# Patient Record
Sex: FEMALE | Race: WHITE | Employment: UNEMPLOYED | URBAN - METROPOLITAN AREA
[De-identification: names, ages, dates, MRNs, and addresses within clinical notes are randomized per-mention and may not be internally consistent; named-entity substitution may affect disease eponyms.]

---

## 2019-06-11 ENCOUNTER — HOSPITAL ENCOUNTER (INPATIENT)
Age: 29
LOS: 2 days | Discharge: LEFT BEFORE DISCHARGE | DRG: 560 | End: 2019-06-13
Attending: OBSTETRICS & GYNECOLOGY | Admitting: OBSTETRICS & GYNECOLOGY
Payer: COMMERCIAL

## 2019-06-11 PROBLEM — Z64.1 MULTIPARITY: Status: ACTIVE | Noted: 2019-06-11

## 2019-06-11 LAB
HCT VFR BLD CALC: 32.9 % (ref 34–48)
HEMOGLOBIN: 10.9 G/DL (ref 11.5–15.5)
MCH RBC QN AUTO: 30 PG (ref 26–35)
MCHC RBC AUTO-ENTMCNC: 33.1 % (ref 32–34.5)
MCV RBC AUTO: 90.6 FL (ref 80–99.9)
PDW BLD-RTO: 13.8 FL (ref 11.5–15)
PLATELET # BLD: 253 E9/L (ref 130–450)
PMV BLD AUTO: 11.3 FL (ref 7–12)
RBC # BLD: 3.63 E12/L (ref 3.5–5.5)
WBC # BLD: 13.2 E9/L (ref 4.5–11.5)

## 2019-06-11 PROCEDURE — 86762 RUBELLA ANTIBODY: CPT

## 2019-06-11 PROCEDURE — 86592 SYPHILIS TEST NON-TREP QUAL: CPT

## 2019-06-11 PROCEDURE — 86703 HIV-1/HIV-2 1 RESULT ANTBDY: CPT

## 2019-06-11 PROCEDURE — 86901 BLOOD TYPING SEROLOGIC RH(D): CPT

## 2019-06-11 PROCEDURE — 1220000001 HC SEMI PRIVATE L&D R&B

## 2019-06-11 PROCEDURE — 87340 HEPATITIS B SURFACE AG IA: CPT

## 2019-06-11 PROCEDURE — 36415 COLL VENOUS BLD VENIPUNCTURE: CPT

## 2019-06-11 PROCEDURE — 86850 RBC ANTIBODY SCREEN: CPT

## 2019-06-11 PROCEDURE — 85027 COMPLETE CBC AUTOMATED: CPT

## 2019-06-11 PROCEDURE — 86900 BLOOD TYPING SEROLOGIC ABO: CPT

## 2019-06-11 RX ORDER — ONDANSETRON 2 MG/ML
4 INJECTION INTRAMUSCULAR; INTRAVENOUS EVERY 6 HOURS PRN
Status: DISCONTINUED | OUTPATIENT
Start: 2019-06-11 | End: 2019-06-12

## 2019-06-11 RX ORDER — SODIUM CHLORIDE 0.9 % (FLUSH) 0.9 %
10 SYRINGE (ML) INJECTION PRN
Status: DISCONTINUED | OUTPATIENT
Start: 2019-06-11 | End: 2019-06-12

## 2019-06-11 RX ORDER — DOCUSATE SODIUM 100 MG/1
100 CAPSULE, LIQUID FILLED ORAL 2 TIMES DAILY
Status: DISCONTINUED | OUTPATIENT
Start: 2019-06-11 | End: 2019-06-12

## 2019-06-11 RX ORDER — SODIUM CHLORIDE 0.9 % (FLUSH) 0.9 %
10 SYRINGE (ML) INJECTION EVERY 12 HOURS SCHEDULED
Status: DISCONTINUED | OUTPATIENT
Start: 2019-06-11 | End: 2019-06-12

## 2019-06-11 RX ORDER — ACETAMINOPHEN 325 MG/1
650 TABLET ORAL EVERY 4 HOURS PRN
Status: DISCONTINUED | OUTPATIENT
Start: 2019-06-11 | End: 2019-06-12

## 2019-06-11 RX ORDER — SODIUM CHLORIDE, SODIUM LACTATE, POTASSIUM CHLORIDE, CALCIUM CHLORIDE 600; 310; 30; 20 MG/100ML; MG/100ML; MG/100ML; MG/100ML
INJECTION, SOLUTION INTRAVENOUS CONTINUOUS
Status: DISCONTINUED | OUTPATIENT
Start: 2019-06-11 | End: 2019-06-12

## 2019-06-12 ENCOUNTER — ANESTHESIA (OUTPATIENT)
Dept: LABOR AND DELIVERY | Age: 29
DRG: 560 | End: 2019-06-12
Payer: COMMERCIAL

## 2019-06-12 ENCOUNTER — ANESTHESIA EVENT (OUTPATIENT)
Dept: LABOR AND DELIVERY | Age: 29
DRG: 560 | End: 2019-06-12
Payer: COMMERCIAL

## 2019-06-12 LAB
ABO/RH: NORMAL
AMPHETAMINE SCREEN, URINE: NOT DETECTED
ANTIBODY SCREEN: NORMAL
BARBITURATE SCREEN URINE: NOT DETECTED
BENZODIAZEPINE SCREEN, URINE: NOT DETECTED
CANNABINOID SCREEN URINE: POSITIVE
COCAINE METABOLITE SCREEN URINE: NOT DETECTED
HEPATITIS B SURFACE ANTIGEN INTERPRETATION: NORMAL
HIV-1 AND HIV-2 ANTIBODIES: NORMAL
METHADONE SCREEN, URINE: NOT DETECTED
OPIATE SCREEN URINE: NOT DETECTED
PHENCYCLIDINE SCREEN URINE: NOT DETECTED
PROPOXYPHENE SCREEN: NOT DETECTED

## 2019-06-12 PROCEDURE — 87491 CHLMYD TRACH DNA AMP PROBE: CPT

## 2019-06-12 PROCEDURE — 1220000000 HC SEMI PRIVATE OB R&B

## 2019-06-12 PROCEDURE — 80307 DRUG TEST PRSMV CHEM ANLYZR: CPT

## 2019-06-12 PROCEDURE — 6370000000 HC RX 637 (ALT 250 FOR IP)

## 2019-06-12 PROCEDURE — 6370000000 HC RX 637 (ALT 250 FOR IP): Performed by: OBSTETRICS & GYNECOLOGY

## 2019-06-12 PROCEDURE — 2500000003 HC RX 250 WO HCPCS: Performed by: ANESTHESIOLOGY

## 2019-06-12 PROCEDURE — 2580000003 HC RX 258: Performed by: OBSTETRICS & GYNECOLOGY

## 2019-06-12 PROCEDURE — 7200000001 HC VAGINAL DELIVERY

## 2019-06-12 PROCEDURE — 3700000025 EPIDURAL BLOCK: Performed by: ANESTHESIOLOGY

## 2019-06-12 PROCEDURE — 87591 N.GONORRHOEAE DNA AMP PROB: CPT

## 2019-06-12 PROCEDURE — 6360000002 HC RX W HCPCS

## 2019-06-12 PROCEDURE — G0480 DRUG TEST DEF 1-7 CLASSES: HCPCS

## 2019-06-12 PROCEDURE — 6360000002 HC RX W HCPCS: Performed by: OBSTETRICS & GYNECOLOGY

## 2019-06-12 RX ORDER — ONDANSETRON 2 MG/ML
4 INJECTION INTRAMUSCULAR; INTRAVENOUS EVERY 6 HOURS PRN
Status: DISCONTINUED | OUTPATIENT
Start: 2019-06-12 | End: 2019-06-12

## 2019-06-12 RX ORDER — OXYCODONE HYDROCHLORIDE AND ACETAMINOPHEN 5; 325 MG/1; MG/1
TABLET ORAL
Status: COMPLETED
Start: 2019-06-12 | End: 2019-06-12

## 2019-06-12 RX ORDER — METHYLERGONOVINE MALEATE 0.2 MG/ML
200 INJECTION INTRAVENOUS ONCE
Status: COMPLETED | OUTPATIENT
Start: 2019-06-12 | End: 2019-06-12

## 2019-06-12 RX ORDER — ACETAMINOPHEN 325 MG/1
650 TABLET ORAL EVERY 4 HOURS PRN
Status: DISCONTINUED | OUTPATIENT
Start: 2019-06-12 | End: 2019-06-13 | Stop reason: HOSPADM

## 2019-06-12 RX ORDER — SODIUM CHLORIDE 0.9 % (FLUSH) 0.9 %
10 SYRINGE (ML) INJECTION PRN
Status: DISCONTINUED | OUTPATIENT
Start: 2019-06-12 | End: 2019-06-13 | Stop reason: HOSPADM

## 2019-06-12 RX ORDER — SODIUM CHLORIDE, SODIUM LACTATE, POTASSIUM CHLORIDE, CALCIUM CHLORIDE 600; 310; 30; 20 MG/100ML; MG/100ML; MG/100ML; MG/100ML
INJECTION, SOLUTION INTRAVENOUS CONTINUOUS
Status: DISCONTINUED | OUTPATIENT
Start: 2019-06-12 | End: 2019-06-13 | Stop reason: HOSPADM

## 2019-06-12 RX ORDER — NALBUPHINE HCL 10 MG/ML
5 AMPUL (ML) INJECTION EVERY 4 HOURS PRN
Status: DISCONTINUED | OUTPATIENT
Start: 2019-06-12 | End: 2019-06-12

## 2019-06-12 RX ORDER — CARBOPROST TROMETHAMINE 250 UG/ML
250 INJECTION, SOLUTION INTRAMUSCULAR ONCE
Status: DISCONTINUED | OUTPATIENT
Start: 2019-06-12 | End: 2019-06-13 | Stop reason: HOSPADM

## 2019-06-12 RX ORDER — NALOXONE HYDROCHLORIDE 0.4 MG/ML
0.4 INJECTION, SOLUTION INTRAMUSCULAR; INTRAVENOUS; SUBCUTANEOUS PRN
Status: DISCONTINUED | OUTPATIENT
Start: 2019-06-12 | End: 2019-06-12

## 2019-06-12 RX ORDER — SODIUM CHLORIDE 0.9 % (FLUSH) 0.9 %
10 SYRINGE (ML) INJECTION EVERY 12 HOURS SCHEDULED
Status: DISCONTINUED | OUTPATIENT
Start: 2019-06-12 | End: 2019-06-13 | Stop reason: HOSPADM

## 2019-06-12 RX ORDER — IBUPROFEN 800 MG/1
800 TABLET ORAL EVERY 8 HOURS PRN
Status: DISCONTINUED | OUTPATIENT
Start: 2019-06-12 | End: 2019-06-13 | Stop reason: HOSPADM

## 2019-06-12 RX ORDER — METHYLERGONOVINE MALEATE 0.2 MG/ML
INJECTION INTRAVENOUS
Status: COMPLETED
Start: 2019-06-12 | End: 2019-06-12

## 2019-06-12 RX ORDER — CARBOPROST TROMETHAMINE 250 UG/ML
INJECTION, SOLUTION INTRAMUSCULAR
Status: DISPENSED
Start: 2019-06-12 | End: 2019-06-12

## 2019-06-12 RX ORDER — FERROUS SULFATE 325(65) MG
325 TABLET ORAL 2 TIMES DAILY WITH MEALS
Status: DISCONTINUED | OUTPATIENT
Start: 2019-06-12 | End: 2019-06-13 | Stop reason: HOSPADM

## 2019-06-12 RX ORDER — DOCUSATE SODIUM 100 MG/1
100 CAPSULE, LIQUID FILLED ORAL 2 TIMES DAILY
Status: DISCONTINUED | OUTPATIENT
Start: 2019-06-12 | End: 2019-06-13 | Stop reason: HOSPADM

## 2019-06-12 RX ORDER — ONDANSETRON 2 MG/ML
INJECTION INTRAMUSCULAR; INTRAVENOUS
Status: COMPLETED
Start: 2019-06-12 | End: 2019-06-12

## 2019-06-12 RX ORDER — LANOLIN 100 %
OINTMENT (GRAM) TOPICAL PRN
Status: DISCONTINUED | OUTPATIENT
Start: 2019-06-12 | End: 2019-06-13 | Stop reason: HOSPADM

## 2019-06-12 RX ORDER — IBUPROFEN 800 MG/1
TABLET ORAL
Status: COMPLETED
Start: 2019-06-12 | End: 2019-06-12

## 2019-06-12 RX ORDER — OXYCODONE HYDROCHLORIDE AND ACETAMINOPHEN 5; 325 MG/1; MG/1
1 TABLET ORAL ONCE
Status: COMPLETED | OUTPATIENT
Start: 2019-06-12 | End: 2019-06-12

## 2019-06-12 RX ADMIN — IBUPROFEN 800 MG: 800 TABLET, FILM COATED ORAL at 03:46

## 2019-06-12 RX ADMIN — IBUPROFEN 800 MG: 800 TABLET, FILM COATED ORAL at 14:36

## 2019-06-12 RX ADMIN — IBUPROFEN 800 MG: 800 TABLET, FILM COATED ORAL at 23:02

## 2019-06-12 RX ADMIN — Medication 999 MILLI-UNITS/MIN: at 02:06

## 2019-06-12 RX ADMIN — ONDANSETRON 4 MG: 2 INJECTION INTRAMUSCULAR; INTRAVENOUS at 05:29

## 2019-06-12 RX ADMIN — OXYCODONE HYDROCHLORIDE AND ACETAMINOPHEN 1 TABLET: 5; 325 TABLET ORAL at 05:15

## 2019-06-12 RX ADMIN — ACETAMINOPHEN 650 MG: 325 TABLET ORAL at 20:46

## 2019-06-12 RX ADMIN — DOCUSATE SODIUM 100 MG: 100 CAPSULE, LIQUID FILLED ORAL at 20:46

## 2019-06-12 RX ADMIN — METHYLERGONOVINE MALEATE 200 MCG: 0.2 INJECTION INTRAVENOUS at 05:05

## 2019-06-12 RX ADMIN — Medication 5 ML: at 00:53

## 2019-06-12 RX ADMIN — METHYLERGONOVINE MALEATE 200 MCG: 0.2 INJECTION, SOLUTION INTRAMUSCULAR; INTRAVENOUS at 05:05

## 2019-06-12 RX ADMIN — Medication 15 ML/HR: at 00:58

## 2019-06-12 RX ADMIN — Medication 10 ML: at 08:08

## 2019-06-12 RX ADMIN — SODIUM CHLORIDE, POTASSIUM CHLORIDE, SODIUM LACTATE AND CALCIUM CHLORIDE: 600; 310; 30; 20 INJECTION, SOLUTION INTRAVENOUS at 05:08

## 2019-06-12 RX ADMIN — BENZOCAINE AND LEVOMENTHOL: 200; 5 SPRAY TOPICAL at 08:22

## 2019-06-12 RX ADMIN — DOCUSATE SODIUM 100 MG: 100 CAPSULE, LIQUID FILLED ORAL at 08:07

## 2019-06-12 SDOH — HEALTH STABILITY: MENTAL HEALTH: HOW OFTEN DO YOU HAVE A DRINK CONTAINING ALCOHOL?: NEVER

## 2019-06-12 ASSESSMENT — PAIN SCALES - GENERAL
PAINLEVEL_OUTOF10: 0
PAINLEVEL_OUTOF10: 6
PAINLEVEL_OUTOF10: 8
PAINLEVEL_OUTOF10: 3
PAINLEVEL_OUTOF10: 10
PAINLEVEL_OUTOF10: 10

## 2019-06-12 NOTE — PROGRESS NOTES
Called because passed few large clots  Uterus just above umbilicus. Large pancake size clot removed from uterus and vagina.  Uterus firm and at umbulicus  Iv pitocin bolus given and methergine im given  Patient with history or asthma  Ms=147/64 p=70  Will observe in labor and delivery

## 2019-06-12 NOTE — ANESTHESIA PRE PROCEDURE
Active Problem List   Diagnosis Code    Multiparity Z64.1       Past Medical History:  No past medical history on file. Past Surgical History:  No past surgical history on file. Social History:    Social History     Tobacco Use    Smoking status: Not on file   Substance Use Topics    Alcohol use: Not on file                                Counseling given: Not Answered      Vital Signs (Current): There were no vitals filed for this visit. BP Readings from Last 3 Encounters:   No data found for BP       NPO Status:                                                                                 BMI:   Wt Readings from Last 3 Encounters:   No data found for Wt     There is no height or weight on file to calculate BMI.    CBC:   Lab Results   Component Value Date    WBC 13.2 06/11/2019    RBC 3.63 06/11/2019    HGB 10.9 06/11/2019    HCT 32.9 06/11/2019    MCV 90.6 06/11/2019    RDW 13.8 06/11/2019     06/11/2019       CMP: No results found for: NA, K, CL, CO2, BUN, CREATININE, GFRAA, AGRATIO, LABGLOM, GLUCOSE, PROT, CALCIUM, BILITOT, ALKPHOS, AST, ALT    POC Tests: No results for input(s): POCGLU, POCNA, POCK, POCCL, POCBUN, POCHEMO, POCHCT in the last 72 hours.     Coags: No results found for: PROTIME, INR, APTT    HCG (If Applicable): No results found for: PREGTESTUR, PREGSERUM, HCG, HCGQUANT     ABGs: No results found for: PHART, PO2ART, KEX2CHC, UEE7IVC, BEART, Y8CSCIOG     Type & Screen (If Applicable):  No results found for: LABABO, LABRH    Anesthesia Evaluation   no history of anesthetic complications:   Airway: Mallampati: II  TM distance: >3 FB   Neck ROM: full  Mouth opening: > = 3 FB Dental: normal exam         Pulmonary:normal exam  breath sounds clear to auscultation  (+) asthma:                            Cardiovascular:Negative CV ROS            Rhythm: regular  Rate: normal                    Neuro/Psych:   Negative Neuro/Psych ROS GI/Hepatic/Renal: Neg GI/Hepatic/Renal ROS            Endo/Other: Negative Endo/Other ROS                    Abdominal:           Vascular: negative vascular ROS.                                        Anesthesia Plan      epidural     ASA 2                                 VIGNESH Chen - CRNA   6/12/2019

## 2019-06-12 NOTE — PLAN OF CARE
Problem: Fluid Volume - Imbalance:  Goal: Absence of imbalanced fluid volume signs and symptoms  Description  Absence of imbalanced fluid volume signs and symptoms  Outcome: Met This Shift  Goal: Absence of postpartum hemorrhage signs and symptoms  Description  Absence of postpartum hemorrhage signs and symptoms  Outcome: Met This Shift     Problem: Infection - Risk of, Puerperal Infection:  Goal: Will show no infection signs and symptoms  Description  Will show no infection signs and symptoms  Outcome: Met This Shift     Problem: Mood - Altered:  Goal: Mood stable  Description  Mood stable  Outcome: Met This Shift     Problem: Pain - Acute:  Goal: Pain level will decrease  Description  Pain level will decrease  Outcome: Met This Shift

## 2019-06-12 NOTE — L&D DELIVERY SUMMARY NOTE
Vaginal Delivery    Infant Wt:  pending    APGARS:     1 minute: 8  5 minute: 9    Time of Delivery: 0202  Delayed cord clamping for 30 seconds. Fetal Position: occiput anterior    Baby Suction with bulb on the perineum. Spontaneous respirations. Placenta delivered spontaneously intact at [place] [address]. Placenta sent to pathology: No     Estimated blood loss:  300 cc    Uterus firm, pitocin running. rectum intact. Episiotomy: No    Laceration: No    Cord blood and gases done  Sponge count correct. No complications.

## 2019-06-12 NOTE — PROGRESS NOTES
Pt asked if she can go outside to smoke. Pt instructed that she is not allowed to leave our unit. no stairwells or elevators. Pt states \"it has been a rough morning\". Pacing around room.

## 2019-06-12 NOTE — PROGRESS NOTES
Patient fundus firm and U/U with minimal bleeding and was not dizzy when ambulating. BP's within patient's normal baseline. Per Dr. Nazario Gallegos, may transfer patient over to [facility]

## 2019-06-12 NOTE — ANESTHESIA PROCEDURE NOTES
Epidural Block    Patient location during procedure: OB  Reason for block: labor epidural  Staffing  Anesthesiologist: Milad Hdz MD  Resident/CRNA: VIGNESH Dave - CRNA  Performed: resident/CRNA   Preanesthetic Checklist  Completed: patient identified, site marked, surgical consent, pre-op evaluation, timeout performed, IV checked, risks and benefits discussed, monitors and equipment checked, anesthesia consent given, oxygen available and patient being monitored  Epidural  Patient position: sitting  Prep: ChloraPrep  Patient monitoring: cardiac monitor, continuous pulse ox and frequent blood pressure checks  Approach: midline  Location: lumbar (1-5)  Injection technique: REFUGIO saline  Provider prep: mask and sterile gloves  Needle  Needle type: Tuohy   Needle gauge: 18 G  Needle length: 2.5 in  Needle insertion depth: 7 cm  Catheter type: end hole  Catheter size: 20 G.   Catheter at skin depth: 15 cm  Test dose: negative  Assessment  Hemodynamics: stable  Attempts: 1

## 2019-06-12 NOTE — PROGRESS NOTES
Not pushing well. Wants epidural like had with other two deliveries.   fht's 120's with accels  May have epidural then to push Patient states she is feeling a little better. She now has hip to hip, tailbone pain. Patient is going to chiropractor today. She states the Valium helped a lot. Patient will be here for appt tomorrow.

## 2019-06-12 NOTE — CARE COORDINATION
SW Discharge Planning   SW received consult for [facility] usage during pregnancy ( Mom with positive UDS on 6/11)    SW met with Marcella Mcpherson ([birth year]) ( denies having phone) mother to baby boy, Juan Todd ( 6/12/19). Also present in the room was baby's reported father, Kasia Vergara ( 1/2/90) of whom Susana Dye gave SW permission to speak freely in front of. Susana Dye reported that she is originally from [place], and stated that she lives at the address listed in the chart, however plans on moving to [place] in the next few weeks. Susana Dye stated that she lives with her two other children, Mejia Ray age 9 1/2 and Radha age 3 1/2, she declined to provide last name or date of births. Corine James reported that he resides at  [address] [place] [address], and declined to provide a phone number. Bhavin reported that both she and Corine James are unemployed and that she plans on adding baby to her [employer] through [place]. Per Susana Dye, her prenatal care was with Dr. Xuan Pringle in [place], and she stated that pediatric care will be with [place]. Bhavin was unable to spell or provide contact information for pediatrician to  including pediatrician's phone number or address. Susana Dye stated that she plans on going back to [place] on Monday for a few weeks, and reported that baby already has a pediatrician appointment. Susana Dye stated that she has all needed infant items including a car seat and crib, and declined [facility] referral at this time. Susana Dye did report a history of depression and post partum depression. We discussed awareness of Post Partum Depression and encouraged contact with her OB if any problems arise. Roseannejefferson denied any past or current legal history, CSB involvement or domestic violence. JOSE A discussed Matt's positive UDS for THC on 6/12, and she admitted using THC during pregnancy.  Per Susana Dye, [facility] is legal in [place], and she stated that she does not believe in using medication, however believes that [facility] is a natural medicine which is \" better for you\". SW discussed the need for a [facility] ( [phone]) referral. Eric Pozo became upset, and concerned that her children would be taken away from her. SW attempted to provide support and comfort. Bhavin's anxiety was noted as she began to ask to go outside to smoke a cigarette , and was noted to be pacing back and forth in her room. Bhavin reported to this SW, \" I have to go back to [place] whether they are open or not. My son has his doctor's appointment up there\". SW provided Eric Pozo with a list of pediatricians in the area as well as a list of [place]. SW called [facility] ( [phone]) and spole with , Duncan Correa, providing him with concerns. PLAN     Baby can NOT be discharged home until [facility] ( [phone]) provides disp  SW to continue to communicate with nursing staff and [facility] ( [phone])   [facility] was declined   List of resources and pediatricians provided to mother.      Electronically signed by TAMY Juárez on 6/12/2019 at 11:02 AM

## 2019-06-12 NOTE — PROGRESS NOTES
Up and about in room. heplock intact. Pt states she has been up to the bathroom and voided and understands the bettye bottle. Pt states she has not had any more clots. Instructed to let RN know if she does. Paperwork and visiting hours reviewed with pt. Phone within reach.

## 2019-06-13 VITALS
SYSTOLIC BLOOD PRESSURE: 117 MMHG | RESPIRATION RATE: 16 BRPM | HEIGHT: 65 IN | TEMPERATURE: 97.5 F | DIASTOLIC BLOOD PRESSURE: 57 MMHG | HEART RATE: 84 BPM | BODY MASS INDEX: 26.66 KG/M2 | WEIGHT: 160 LBS

## 2019-06-13 LAB
RPR: NORMAL
RUBELLA ANTIBODY IGG: NORMAL

## 2019-06-13 PROCEDURE — 6370000000 HC RX 637 (ALT 250 FOR IP): Performed by: OBSTETRICS & GYNECOLOGY

## 2019-06-13 RX ADMIN — ACETAMINOPHEN 650 MG: 325 TABLET ORAL at 05:35

## 2019-06-13 RX ADMIN — DOCUSATE SODIUM 100 MG: 100 CAPSULE, LIQUID FILLED ORAL at 09:44

## 2019-06-13 RX ADMIN — IBUPROFEN 800 MG: 800 TABLET, FILM COATED ORAL at 09:44

## 2019-06-13 ASSESSMENT — PAIN SCALES - GENERAL
PAINLEVEL_OUTOF10: 3
PAINLEVEL_OUTOF10: 10

## 2019-06-13 NOTE — FLOWSHEET NOTE
Reviewed discharge teaching with patient. Patient verbalizes understanding and when to call doctor. Patient now reports her 332 year old is here in the area and she needs to be with here because her  is going camping. AMA papers signed.  to nursery.  Patient has number to nursery to check on baby and states she will be back at 6 am.

## 2019-06-13 NOTE — PLAN OF CARE
Problem: Constipation:  Goal: Bowel elimination is within specified parameters  Description  Bowel elimination is within specified parameters  Outcome: Met This Shift     Problem: Fluid Volume - Imbalance:  Goal: Absence of imbalanced fluid volume signs and symptoms  Description  Absence of imbalanced fluid volume signs and symptoms  Outcome: Met This Shift  Goal: Absence of postpartum hemorrhage signs and symptoms  Description  Absence of postpartum hemorrhage signs and symptoms  Outcome: Met This Shift     Problem: Infection - Risk of, Puerperal Infection:  Goal: Will show no infection signs and symptoms  Description  Will show no infection signs and symptoms  Outcome: Met This Shift     Problem: Mood - Altered:  Goal: Mood stable  Description  Mood stable  Outcome: Met This Shift

## 2019-06-13 NOTE — FLOWSHEET NOTE
Patient reports she is leaving and will come get baby tomorrow AM. Instructed patient that she would need to sign AMA papers due to no discharge. Patient aware that peds not writing discharge for today. Dr Carmon Olszewski paged and is in the OR at this time.  notified.

## 2019-06-13 NOTE — FLOWSHEET NOTE
Patient upset and stating there is no reason why her and baby can't go home. Patient states \"we are both healthy and that is what matters\". Pt reports she has a 332 year old in [place] and as of tomorrow morning she has no one to babysit her. Patient aware Dr. Dorothy Kamara discharging patient. Patient made aware  doesn't have an order to discharge either. Patient reports she needs to go. Patient reports she talked to  and has her number. Our  notified and calling [facility].

## 2019-06-13 NOTE — PROGRESS NOTES
Subjective:    Patient without complaints. Normal lochia. Objective:  BP (!) 117/57   Pulse 84   Temp 97.5 °F (36.4 °C) (Oral)   Resp 16   Ht 5' 5\" (1.651 m)   Wt 160 lb (72.6 kg)   LMP 09/03/2018   BMI 26.63 kg/m²    Lungs cta  Heart rrr  Abdomen:  Uterus firm, non-tender  Extremities:  No calf pain    Assessment:  Post-partum day # 1   vagilnal delivery  Care in [address] [place] [address] + marijuana.  Being seen by social service    Plan:Continue current care

## 2019-06-13 NOTE — CARE COORDINATION
Discharge planning     Roseannejefferson's nurse approached SW to inform SW that Nanda Parents wants to be discharged today, however her physician would like to keep her an extra day for medical reasons. Nurse reported that Nanda Parents stated that she needs to go back to [place] as her two year old has no . SW called and informed [facility] ( [phone]) , [facility], of the situation, who stated she would call mother to speak with her. SW also noted that mother provided a different address upon registration [address] [place] [address], [address] [place] [address] Dr HURT and provided that information to [facility]. Nursing staff reported to SW that mother has decided to leave against medical advice, and is willing to leave the baby here at the hospital until she returns tomorrow. [facility] was updated. PLAN  Baby can NOT be discharged until [facility] ( [phone]) provides disposition  SW to continue communication with [facility] ( [phone])  Mother has chosen to leave against medical advice.     Electronically signed by TAMY Meneses on 6/13/2019 at 1:24 PM

## 2019-06-14 LAB
C. TRACHOMATIS DNA ,URINE: NEGATIVE
N. GONORRHOEAE DNA, URINE: NEGATIVE
SOURCE: NORMAL

## 2019-06-14 NOTE — ANESTHESIA POSTPROCEDURE EVALUATION
Department of Anesthesiology  Postprocedure Note    Patient: Jose Alfredo Lai  MRN: [id number]  [place]: 1990  Date of evaluation: 6/14/2019  Time:  2:36 PM     Procedure Summary     Date:  06/12/19 Room / Location:      Anesthesia Start:  0040 Anesthesia Stop:  0202    Procedure:  Labor Analgesia Diagnosis:      Scheduled Providers:   Responsible Provider:  Chio Pedro MD    Anesthesia Type:  epidural ASA Status:  2          Anesthesia Type: epidural    Henry Phase I:      Henry Phase II:      Last vitals: Reviewed and per EMR flowsheets.        Anesthesia Post Evaluation    Patient location during evaluation: floor  Patient participation: complete - patient participated  Level of consciousness: awake and alert  Airway patency: patent  Nausea & Vomiting: no nausea and no vomiting  Complications: no  Cardiovascular status: hemodynamically stable  Respiratory status: acceptable  Hydration status: euvolemic

## 2019-06-16 LAB — CANNABINOIDS CONF, URINE: >500 NG/ML

## 2019-09-13 ENCOUNTER — HOSPITAL ENCOUNTER (EMERGENCY)
Age: 29
Discharge: HOME | End: 2019-09-13

## 2019-09-13 VITALS
TEMPERATURE: 98.7 F | HEIGHT: 65 IN | DIASTOLIC BLOOD PRESSURE: 59 MMHG | SYSTOLIC BLOOD PRESSURE: 120 MMHG | BODY MASS INDEX: 22.49 KG/M2 | HEART RATE: 81 BPM | RESPIRATION RATE: 18 BRPM | OXYGEN SATURATION: 98 % | WEIGHT: 135 LBS

## 2019-09-13 DIAGNOSIS — K08.89 PAIN, DENTAL: Primary | ICD-10-CM

## 2019-09-13 DIAGNOSIS — K02.9 DENTAL CARIES: ICD-10-CM

## 2019-09-13 PROCEDURE — 99282 EMERGENCY DEPT VISIT SF MDM: CPT

## 2019-09-13 PROCEDURE — 6370000000 HC RX 637 (ALT 250 FOR IP): Performed by: NURSE PRACTITIONER

## 2019-09-13 RX ORDER — CHLORHEXIDINE GLUCONATE 0.12 MG/ML
15 RINSE ORAL 2 TIMES DAILY
Qty: 420 ML | Refills: 0 | Status: SHIPPED | OUTPATIENT
Start: 2019-09-13 | End: 2019-09-27

## 2019-09-13 RX ORDER — ONDANSETRON 4 MG/1
4 TABLET, ORALLY DISINTEGRATING ORAL 3 TIMES DAILY PRN
Qty: 21 TABLET | Refills: 0 | Status: ON HOLD | OUTPATIENT
Start: 2019-09-13 | End: 2020-07-30 | Stop reason: HOSPADM

## 2019-09-13 RX ORDER — LIDOCAINE HYDROCHLORIDE 20 MG/ML
5 SOLUTION OROPHARYNGEAL PRN
Qty: 100 ML | Refills: 0 | Status: ON HOLD | OUTPATIENT
Start: 2019-09-13 | End: 2020-07-30 | Stop reason: HOSPADM

## 2019-09-13 RX ORDER — ONDANSETRON 4 MG/1
4 TABLET, ORALLY DISINTEGRATING ORAL ONCE
Status: COMPLETED | OUTPATIENT
Start: 2019-09-13 | End: 2019-09-13

## 2019-09-13 RX ORDER — PENICILLIN V POTASSIUM 250 MG/1
500 TABLET ORAL ONCE
Status: COMPLETED | OUTPATIENT
Start: 2019-09-13 | End: 2019-09-13

## 2019-09-13 RX ORDER — LIDOCAINE HYDROCHLORIDE 20 MG/ML
15 SOLUTION OROPHARYNGEAL ONCE
Status: COMPLETED | OUTPATIENT
Start: 2019-09-13 | End: 2019-09-13

## 2019-09-13 RX ORDER — PENICILLIN V POTASSIUM 500 MG/1
500 TABLET ORAL 4 TIMES DAILY
Qty: 40 TABLET | Refills: 0 | Status: SHIPPED | OUTPATIENT
Start: 2019-09-13 | End: 2019-09-23

## 2019-09-13 RX ORDER — HYDROCODONE BITARTRATE AND ACETAMINOPHEN 5; 325 MG/1; MG/1
1 TABLET ORAL ONCE
Status: COMPLETED | OUTPATIENT
Start: 2019-09-13 | End: 2019-09-13

## 2019-09-13 RX ADMIN — HYDROCODONE BITARTRATE AND ACETAMINOPHEN 1 TABLET: 5; 325 TABLET ORAL at 21:27

## 2019-09-13 RX ADMIN — PENICILLIN V POTASIUM 500 MG: 250 TABLET ORAL at 21:27

## 2019-09-13 RX ADMIN — LIDOCAINE HYDROCHLORIDE 15 ML: 20 SOLUTION ORAL; TOPICAL at 21:27

## 2019-09-13 RX ADMIN — ONDANSETRON 4 MG: 4 TABLET, ORALLY DISINTEGRATING ORAL at 21:27

## 2019-09-13 ASSESSMENT — PAIN DESCRIPTION - ORIENTATION: ORIENTATION: RIGHT;LOWER

## 2019-09-13 ASSESSMENT — PAIN SCALES - GENERAL
PAINLEVEL_OUTOF10: 10
PAINLEVEL_OUTOF10: 10

## 2019-09-13 ASSESSMENT — PAIN DESCRIPTION - PAIN TYPE: TYPE: ACUTE PAIN

## 2019-09-13 ASSESSMENT — PAIN DESCRIPTION - FREQUENCY: FREQUENCY: CONTINUOUS

## 2019-09-13 ASSESSMENT — PAIN DESCRIPTION - PROGRESSION: CLINICAL_PROGRESSION: NOT CHANGED

## 2019-09-13 ASSESSMENT — PAIN DESCRIPTION - ONSET: ONSET: GRADUAL

## 2019-09-13 ASSESSMENT — PAIN DESCRIPTION - DESCRIPTORS: DESCRIPTORS: ACHING;DISCOMFORT;SORE;SHOOTING

## 2019-09-13 ASSESSMENT — PAIN DESCRIPTION - LOCATION: LOCATION: MOUTH

## 2020-07-29 ENCOUNTER — HOSPITAL ENCOUNTER (INPATIENT)
Age: 30
LOS: 1 days | Discharge: HOME | DRG: 560 | End: 2020-07-30
Attending: OBSTETRICS & GYNECOLOGY | Admitting: OBSTETRICS & GYNECOLOGY
Payer: COMMERCIAL

## 2020-07-29 LAB
ABO/RH: NORMAL
ANTIBODY SCREEN: NORMAL
BASOPHILS ABSOLUTE: 0.03 E9/L (ref 0–0.2)
BASOPHILS RELATIVE PERCENT: 0.3 % (ref 0–2)
EOSINOPHILS ABSOLUTE: 0.31 E9/L (ref 0.05–0.5)
EOSINOPHILS RELATIVE PERCENT: 3.1 % (ref 0–6)
HCT VFR BLD CALC: 35 % (ref 34–48)
HEMOGLOBIN: 11.2 G/DL (ref 11.5–15.5)
IMMATURE GRANULOCYTES #: 0.1 E9/L
IMMATURE GRANULOCYTES %: 1 % (ref 0–5)
LYMPHOCYTES ABSOLUTE: 1.66 E9/L (ref 1.5–4)
LYMPHOCYTES RELATIVE PERCENT: 16.4 % (ref 20–42)
MCH RBC QN AUTO: 27.5 PG (ref 26–35)
MCHC RBC AUTO-ENTMCNC: 32 % (ref 32–34.5)
MCV RBC AUTO: 86 FL (ref 80–99.9)
MONOCYTES ABSOLUTE: 1.1 E9/L (ref 0.1–0.95)
MONOCYTES RELATIVE PERCENT: 10.8 % (ref 2–12)
NEUTROPHILS ABSOLUTE: 6.95 E9/L (ref 1.8–7.3)
NEUTROPHILS RELATIVE PERCENT: 68.4 % (ref 43–80)
PDW BLD-RTO: 16.7 FL (ref 11.5–15)
PLATELET # BLD: 216 E9/L (ref 130–450)
PMV BLD AUTO: 11.3 FL (ref 7–12)
RBC # BLD: 4.07 E12/L (ref 3.5–5.5)
WBC # BLD: 10.2 E9/L (ref 4.5–11.5)

## 2020-07-29 PROCEDURE — 86900 BLOOD TYPING SEROLOGIC ABO: CPT

## 2020-07-29 PROCEDURE — 36415 COLL VENOUS BLD VENIPUNCTURE: CPT

## 2020-07-29 PROCEDURE — 85025 COMPLETE CBC W/AUTO DIFF WBC: CPT

## 2020-07-29 PROCEDURE — 6370000000 HC RX 637 (ALT 250 FOR IP): Performed by: OBSTETRICS & GYNECOLOGY

## 2020-07-29 PROCEDURE — 6360000002 HC RX W HCPCS: Performed by: NURSE PRACTITIONER

## 2020-07-29 PROCEDURE — 86901 BLOOD TYPING SEROLOGIC RH(D): CPT

## 2020-07-29 PROCEDURE — 86850 RBC ANTIBODY SCREEN: CPT

## 2020-07-29 PROCEDURE — 2580000003 HC RX 258: Performed by: NURSE PRACTITIONER

## 2020-07-29 PROCEDURE — 59409 OBSTETRICAL CARE: CPT | Performed by: OBSTETRICS & GYNECOLOGY

## 2020-07-29 PROCEDURE — 1220000000 HC SEMI PRIVATE OB R&B

## 2020-07-29 PROCEDURE — 7200000001 HC VAGINAL DELIVERY

## 2020-07-29 PROCEDURE — 6360000002 HC RX W HCPCS

## 2020-07-29 RX ORDER — PRENATAL WITH FERROUS FUM AND FOLIC ACID 3080; 920; 120; 400; 22; 1.84; 3; 20; 10; 1; 12; 200; 27; 25; 2 [IU]/1; [IU]/1; MG/1; [IU]/1; MG/1; MG/1; MG/1; MG/1; MG/1; MG/1; UG/1; MG/1; MG/1; MG/1; MG/1
1 TABLET ORAL DAILY
Status: DISCONTINUED | OUTPATIENT
Start: 2020-07-29 | End: 2020-07-30 | Stop reason: HOSPADM

## 2020-07-29 RX ORDER — SODIUM CHLORIDE 0.9 % (FLUSH) 0.9 %
10 SYRINGE (ML) INJECTION PRN
Status: DISCONTINUED | OUTPATIENT
Start: 2020-07-29 | End: 2020-07-30 | Stop reason: HOSPADM

## 2020-07-29 RX ORDER — PENICILLIN G POTASSIUM 5000000 [IU]/1
INJECTION, POWDER, FOR SOLUTION INTRAMUSCULAR; INTRAVENOUS
Status: DISCONTINUED
Start: 2020-07-29 | End: 2020-07-29

## 2020-07-29 RX ORDER — ACETAMINOPHEN 325 MG/1
650 TABLET ORAL EVERY 4 HOURS PRN
Status: DISCONTINUED | OUTPATIENT
Start: 2020-07-29 | End: 2020-07-29

## 2020-07-29 RX ORDER — ACETAMINOPHEN 650 MG
TABLET, EXTENDED RELEASE ORAL
Status: DISCONTINUED
Start: 2020-07-29 | End: 2020-07-29

## 2020-07-29 RX ORDER — MODIFIED LANOLIN
OINTMENT (GRAM) TOPICAL PRN
Status: DISCONTINUED | OUTPATIENT
Start: 2020-07-29 | End: 2020-07-30 | Stop reason: HOSPADM

## 2020-07-29 RX ORDER — SODIUM CHLORIDE, SODIUM LACTATE, POTASSIUM CHLORIDE, CALCIUM CHLORIDE 600; 310; 30; 20 MG/100ML; MG/100ML; MG/100ML; MG/100ML
INJECTION, SOLUTION INTRAVENOUS CONTINUOUS
Status: DISCONTINUED | OUTPATIENT
Start: 2020-07-29 | End: 2020-07-30 | Stop reason: HOSPADM

## 2020-07-29 RX ORDER — SODIUM CHLORIDE 0.9 % (FLUSH) 0.9 %
10 SYRINGE (ML) INJECTION EVERY 12 HOURS SCHEDULED
Status: DISCONTINUED | OUTPATIENT
Start: 2020-07-29 | End: 2020-07-30 | Stop reason: HOSPADM

## 2020-07-29 RX ORDER — DOCUSATE SODIUM 100 MG/1
100 CAPSULE, LIQUID FILLED ORAL 2 TIMES DAILY
Status: DISCONTINUED | OUTPATIENT
Start: 2020-07-29 | End: 2020-07-30 | Stop reason: HOSPADM

## 2020-07-29 RX ORDER — SODIUM CHLORIDE, SODIUM LACTATE, POTASSIUM CHLORIDE, CALCIUM CHLORIDE 600; 310; 30; 20 MG/100ML; MG/100ML; MG/100ML; MG/100ML
INJECTION, SOLUTION INTRAVENOUS CONTINUOUS
Status: DISCONTINUED | OUTPATIENT
Start: 2020-07-29 | End: 2020-07-29

## 2020-07-29 RX ORDER — PENICILLIN G 3000000 [IU]/50ML
3 INJECTION, SOLUTION INTRAVENOUS EVERY 4 HOURS
Status: DISCONTINUED | OUTPATIENT
Start: 2020-07-29 | End: 2020-07-29

## 2020-07-29 RX ORDER — ACETAMINOPHEN 325 MG/1
650 TABLET ORAL EVERY 4 HOURS PRN
Status: DISCONTINUED | OUTPATIENT
Start: 2020-07-29 | End: 2020-07-30 | Stop reason: HOSPADM

## 2020-07-29 RX ADMIN — Medication 999 MILLI-UNITS/MIN: at 09:16

## 2020-07-29 RX ADMIN — SODIUM CHLORIDE, POTASSIUM CHLORIDE, SODIUM LACTATE AND CALCIUM CHLORIDE: 600; 310; 30; 20 INJECTION, SOLUTION INTRAVENOUS at 08:30

## 2020-07-29 RX ADMIN — ACETAMINOPHEN 650 MG: 325 TABLET ORAL at 10:08

## 2020-07-29 RX ADMIN — DEXTROSE MONOHYDRATE 5 MILLION UNITS: 50 INJECTION, SOLUTION INTRAVENOUS at 08:44

## 2020-07-29 RX ADMIN — DOCUSATE SODIUM 100 MG: 100 CAPSULE, LIQUID FILLED ORAL at 20:58

## 2020-07-29 RX ADMIN — ACETAMINOPHEN 650 MG: 325 TABLET ORAL at 16:21

## 2020-07-29 RX ADMIN — ACETAMINOPHEN 650 MG: 325 TABLET ORAL at 20:58

## 2020-07-29 ASSESSMENT — PAIN DESCRIPTION - PAIN TYPE: TYPE: ACUTE PAIN

## 2020-07-29 ASSESSMENT — PAIN DESCRIPTION - PROGRESSION: CLINICAL_PROGRESSION: NOT CHANGED

## 2020-07-29 ASSESSMENT — PAIN SCALES - GENERAL
PAINLEVEL_OUTOF10: 9
PAINLEVEL_OUTOF10: 2
PAINLEVEL_OUTOF10: 3
PAINLEVEL_OUTOF10: 3

## 2020-07-29 ASSESSMENT — PAIN DESCRIPTION - LOCATION: LOCATION: ABDOMEN

## 2020-07-29 ASSESSMENT — PAIN DESCRIPTION - DESCRIPTORS: DESCRIPTORS: CRAMPING

## 2020-07-29 ASSESSMENT — PAIN DESCRIPTION - ORIENTATION: ORIENTATION: LOWER

## 2020-07-29 ASSESSMENT — PAIN DESCRIPTION - ONSET: ONSET: ON-GOING

## 2020-07-29 ASSESSMENT — PAIN - FUNCTIONAL ASSESSMENT: PAIN_FUNCTIONAL_ASSESSMENT: ACTIVITIES ARE NOT PREVENTED

## 2020-07-29 NOTE — CARE COORDINATION
SW Discharge Planning   SW received consult for [facility] usage during pregnancy ( Mom AND baby with positive UDS on 7/29)     JOSE A met with Yovanny Rogers ([birth year]) ([phone]) mother to baby boy, Anne Dumont ( 7/29/20) . Bhavin reported that she resides with baby's father, Kate Knox ( 1/2/90) at the address listed in the chart along with their three children Kimberly Butcher ( 6/12/19)  Sarae Sow age 6 1/2 and Radha age 1 1/2. Rodolfo Karthik reported that she was in town visiting her mother in law when she went into labor. Bhavin reported that her fiance Jennifer Child is a , and when his routes take him to [facility] she goes with him to visit family. Rodolfo Angeles reported that she is currently unemployed and Jennifer Child is employed at [employer], and baby will be added to their [employer]. Per Rodolfo Angeles, her prenatal care was with Dr. Gabe Wesley in [place], and she stated that pediatric care will be with Dr. Rossi Son. Rodolfo Angeles stated that she has all needed infant items including a car seat and crib, and declined [facility] referral at this time. We discussed safe sleep practices. Rodolfo Angeles did report a history of depression and post partum depression. We discussed awareness of Post Partum Depression and encouraged contact with her OB if any problems arise. Bhavin denied any past or current legal history,  or domestic violence. Rodolfo Angeles did report previous [facility] ( [phone]) involvement with her last baby due to [facility] usage which this SW was also involved with. JOSE A discussed Matt's positive UDS for THC on 6/12, and she admitted using THC during pregnancy. Per Rodolfo Angeles, [facility] is legal in [place], and she stated that she does not believe in using medication, however believes that [facility] is a natural medicine which is \" better for you\".    Hoangkev Dohertyer stated that she uses in moderation, typically in the morning before her children wake up and at night time after they are asleep to sleep herself. Sabrina River reported that she does not used THC in front of her children. SW discussed the need for a [facility] Children services referral ( 7-14-) which Sabrina River expressed understanding and stated she expected.   SW completed this referral to Alejandra Johnson in in take ( intake ID [id number]).     PLAN      Baby can NOT be discharged home until [facility]) [phone]  services ( 9-80-) provides disposition    SW to continue to communicate with nursing staff and [facility] [address]  services referral ( 5-72-)        Electronically signed by Cole Jones on 7/29/2020 at 3:46 PM

## 2020-07-29 NOTE — L&D DELIVERY NOTE
Department of Obstetrics and Gynecology  Spontaneous Vaginal Delivery Note      Pre-operative Diagnosis:  Term pregnancy    Post-operative Diagnosis:  Living  infant(s) and Male    Information for the patient's :  Sabiha Love [[id number]]                    Infant Wt: 7 pounds 13 ounces / 3930 g  Information for the patient's :  Sabiha Love [[id number]]             APGARS: 9/9   Information for the patient's :  Sabiha Love [[id number]]             Anesthesia:  none      Delivery Summary: Spontaneous vaginal delivery of a living male baby in occipitoanterior over an intact perineum at [phone]. Airways of the baby cleared of all secretion with suction bulb as soon as the head was out from the vagina. Placenta and menbranes, complete and normal were delivered at 915. Perineum: Intact, no lacerations, no episiotomy  Mother and Baby are in good condition. Specimen: Cord blood for arterial blood gases and venous blood gases and for routine  screening lab tests     Estimated blood loss: 350 mL             Condition:  infant stable to general nursery    Blood Type and Rh: A POS        Rubella Immunity Status:   Unknown           Infant Feeding:    bottle - Similac with low iron    Attending Attestation: I performed the procedure.

## 2020-07-29 NOTE — PLAN OF CARE
Problem: Fluid Volume - Imbalance:  Goal: Absence of imbalanced fluid volume signs and symptoms  Description: Absence of imbalanced fluid volume signs and symptoms  Outcome: Met This Shift  Goal: Absence of postpartum hemorrhage signs and symptoms  Description: Absence of postpartum hemorrhage signs and symptoms  Outcome: Met This Shift     Problem: Pain - Acute:  Goal: Pain level will decrease  Description: Pain level will decrease  Outcome: Met This Shift     Problem: Infection - Risk of, Puerperal Infection:  Goal: Will show no infection signs and symptoms  Description: Will show no infection signs and symptoms  Outcome: Met This Shift     Problem: Mood - Altered:  Goal: Mood stable  Description: Mood stable  Outcome: Met This Shift     Problem: Anxiety:  Goal: Level of anxiety will decrease  Description: Level of anxiety will decrease  Outcome: Completed     Problem: Breathing Pattern - Ineffective:  Goal: Able to breathe comfortably  Description: Able to breathe comfortably  Outcome: Completed     Problem: Fluid Volume - Imbalance:  Goal: Absence of imbalanced fluid volume signs and symptoms  Description: Absence of imbalanced fluid volume signs and symptoms  Outcome: Completed  Goal: Absence of intrapartum hemorrhage signs and symptoms  Description: Absence of intrapartum hemorrhage signs and symptoms  Outcome: Completed     Problem: Infection - Intrapartum Infection:  Goal: Will show no infection signs and symptoms  Description: Will show no infection signs and symptoms  Outcome: Completed     Problem: Labor Process - Prolonged:  Goal: Labor progression, first stage, within specified pattern  Description: Labor progression, first stage, within specified pattern  Outcome: Completed  Goal: Labor progession, second stage, within specified pattern  Description: Labor progession, second stage, within specified pattern  Outcome: Completed  Goal: Uterine contractions within specified parameters  Description: Uterine contractions within specified parameters  Outcome: Completed     Problem:  Screening:  Goal: Ability to make informed decisions regarding treatment has improved  Description: Ability to make informed decisions regarding treatment has improved  Outcome: Completed     Problem: Pain - Acute:  Goal: Pain level will decrease  Description: Pain level will decrease  Outcome: Completed  Goal: Able to cope with pain  Description: Able to cope with pain  Outcome: Completed     Problem: Tissue Perfusion - Uteroplacental, Altered:  Goal: Absence of abnormal fetal heart rate pattern  Description: Absence of abnormal fetal heart rate pattern  Outcome: Completed     Problem: Urinary Retention:  Goal: Experiences of bladder distention will decrease  Description: Experiences of bladder distention will decrease  Outcome: Completed  Goal: Urinary elimination within specified parameters  Description: Urinary elimination within specified parameters  Outcome: Completed

## 2020-07-29 NOTE — CARE COORDINATION
SW Discharge Planning   SW received consult for [facility] usage during pregnancy ( Mom AND baby with positive UDS on 7/29)     SW met with Robson Pascal ([birth year]) ([phone]) mother to baby boy, Yohan Shah ( 7/29/20) . Bhavin reported that she resides with baby's father, Lisandra Cummins ( 1/2/90) at the address listed in the chart along with their three children Micah Red ( 6/12/19)  Terrance Ganser age 6 1/2 and Radha age 1 1/2. Sabrina River reported that she is currently unemployed and Lakisha Garcia is employed at [employer], and baby will be added to their [email or web address]. Per Sabrina River, her prenatal care was with Dr. Luis Enrique Palacios in [place], and she stated that pediatric care will be with Dr. Prashant Gregory. Sabrina River stated that she has all needed infant items including a car seat and crib, and declined [facility] referral at this time. We discussed safe sleep practices. Sabrina River did report a history of depression and post partum depression. We discussed awareness of Post Partum Depression and encouraged contact with her OB if any problems arise. Bhavin denied any past or current legal history,  or domestic violence. Sabrina River did report previous [facility] ( [phone]) involvement with her last baby due to [facility] usage which this SW was also involved with. SW discussed Matt's positive UDS for THC on 6/12, and she admitted using THC during pregnancy. Per Sabrina Rivre, [facility] is legal in [place], and she stated that she does not believe in using medication, however believes that [facility] is a natural medicine which is \" better for you\". Sabrina River stated that she uses in moderation, typically in the morning before her children wake up and at night time after they are asleep to sleep herself. Sabrina River reported that she does not used THC in front of her children.       SW discussed the need for a [facility] ( [place]) Children services referral ( 3-74-) which Sabrina River expressed

## 2020-07-29 NOTE — PROGRESS NOTES
male infant 39w4d at 5, apgars 9/9, wt 7# 13oz. AROM clear fluid prior to delivery at 848, GBS unknown, one dose of PCN immediately prior to and during delivery.

## 2020-07-29 NOTE — PROGRESS NOTES
Ambulatory to BR without assistance, voided, bettye care done, new pad on, declines ice pack at this time. Ambulatory back to bed. Tolerated well.

## 2020-07-29 NOTE — PROGRESS NOTES
Admitted to room 308 from L&D wheelchair with infant and accompanied by RN from L&D. Oriented to call light at bedside, RN cell number, Doctor's orders, need to call for help with first out of bed to void, infant safety and security, pamphlets at bedside, visitation policy of one overnight visitor over the age of 25 who is not interchangeable, and ordering meals.

## 2020-07-29 NOTE — PLAN OF CARE
Problem: Anxiety:  Goal: Level of anxiety will decrease  Description: Level of anxiety will decrease  Outcome: Completed     Problem: Breathing Pattern - Ineffective:  Goal: Able to breathe comfortably  Description: Able to breathe comfortably  Outcome: Completed     Problem: Fluid Volume - Imbalance:  Goal: Absence of imbalanced fluid volume signs and symptoms  Description: Absence of imbalanced fluid volume signs and symptoms  Outcome: Completed  Goal: Absence of intrapartum hemorrhage signs and symptoms  Description: Absence of intrapartum hemorrhage signs and symptoms  Outcome: Completed     Problem: Infection - Intrapartum Infection:  Goal: Will show no infection signs and symptoms  Description: Will show no infection signs and symptoms  Outcome: Completed     Problem: Labor Process - Prolonged:  Goal: Labor progression, first stage, within specified pattern  Description: Labor progression, first stage, within specified pattern  Outcome: Completed  Goal: Labor progession, second stage, within specified pattern  Description: Labor progession, second stage, within specified pattern  Outcome: Completed  Goal: Uterine contractions within specified parameters  Description: Uterine contractions within specified parameters  Outcome: Completed     Problem:  Screening:  Goal: Ability to make informed decisions regarding treatment has improved  Description: Ability to make informed decisions regarding treatment has improved  Outcome: Completed     Problem: Pain - Acute:  Goal: Pain level will decrease  Description: Pain level will decrease  Outcome: Completed  Goal: Able to cope with pain  Description: Able to cope with pain  Outcome: Completed     Problem: Tissue Perfusion - Uteroplacental, Altered:  Goal: Absence of abnormal fetal heart rate pattern  Description: Absence of abnormal fetal heart rate pattern  Outcome: Completed     Problem: Urinary Retention:  Goal: Experiences of bladder distention will decrease  Description: Experiences of bladder distention will decrease  Outcome: Completed  Goal: Urinary elimination within specified parameters  Description: Urinary elimination within specified parameters  Outcome: Completed

## 2020-07-29 NOTE — H&P
Department of Obstetrics and Gynecology  Nurse Practitioner Obstetrics History and Physical        CHIEF COMPLAINT:  Contractions/SROM    HISTORY OF PRESENT ILLNESS:    The patient is a 30 y.o. female , Patient's last menstrual period was 2019.,  at Unknown. Pt presents to l&d with complaints of lof and ctx starting about an hr ago. Pt denies vb or decreased fm. Pt states she was traveling from [facility] to visit with her in laws. Pt denies complications with pregnancy. She has seen an ob regularly with this pregnancy. Prenatal labs obtained from [facility]. Pt admits to Marijuana use during the pregnancy. OB History        2    Para   1    Term   1       0    AB   0    Living   1       SAB   0    TAB   0    Ectopic   0    Molar   0    Multiple        Live Births   1                Estimated Due Date: Estimated Date of Delivery: None noted. PRENATAL CARE:  Pt from out of town, pt denies complications    Complicated by:   Patient Active Problem List   Diagnosis Code    Multiparity Z64.1       PAST OB HISTORY  OB History        2    Para   1    Term   1       0    AB   0    Living   1       SAB   0    TAB   0    Ectopic   0    Molar   0    Multiple        Live Births   1                Past Medical History:        Diagnosis Date    Anemia     Asthma      Past Surgical History:    No past surgical history on file. Social History:    TOBACCO:   reports that she has been smoking cigarettes. She has never used smokeless tobacco.  ETOH:   reports previous alcohol use. DRUGS:   reports no history of drug use. Family History:   No family history on file.   Medications Prior to Admission:  Medications Prior to Admission: ondansetron (ZOFRAN-ODT) 4 MG disintegrating tablet, Take 1 tablet by mouth 3 times daily as needed for Nausea or Vomiting  Prenatal MV-Min-Fe Fum-FA-DHA (PRENATAL 1 PO), Take by mouth  lidocaine viscous hcl (XYLOCAINE) 2 % SOLN solution, Take 5 mLs by mouth as needed for Dental Pain  Allergies:  Ibuprofen and Bee venom    Review of Systems:   Ears, nose, mouth, throat, and face: negative  Respiratory: negative  Cardiovascular: negative  Gastrointestinal: negative  Genitourinary:negative  Integument/breast: negative  Hematologic/lymphatic: negative  Musculoskeletal:negative  Neurological: negative  Behavioral/Psych: negative  Endocrine: negative  Allergic/Immunologic: negative  Psychosocial: negative    PHYSICAL EXAM:    General appearance:  awake, alert, cooperative, no apparent distress, and appears stated age  Neurologic:  Awake, alert, oriented to name, place and time. Cranial nerves II-XII are grossly intact. Lungs:  clear to auscultation bilaterally  Heart:  regular rate and rhythm  Abdomen:   soft, non-distended, non-tender, no masses palpated, gravid  Fetal heart rate:  Baseline Heart Rate 115, accelerations:  present, decels:noen  Pelvis:  External Genitalia: no lesions  Cervix:  DILATION:  10 cm  EFFACEMENT:   100%  STATION:  +1  CONSISTENCY:  soft    Contraction frequency:  1-2  minutes  Membranes:  srom ~0730    LMP 08/17/2019                 Prenatal Labs  Blood Type/Rh: A pos  Antibody Screen: negative  Rubella: immune  T.  Pallidum, IgG: non-reactive   Hepatitis B Surface Antigen: non-reactive   HIV: non-reactive   Sickle Cell Screen: not available  Gonorrhea: negative  Chlamydia: negative  Group B Strep: not available   Labs obtained from care everywhere      ASSESSMENT AND PLAN:  D/w Dr. Ya Moreira for unknown gbs  uds  Routine labs          Electronically signed by VIGNESH Chaudhary CNP on 7/29/2020 at 8:56 AM

## 2020-07-30 VITALS
RESPIRATION RATE: 40 BRPM | SYSTOLIC BLOOD PRESSURE: 128 MMHG | TEMPERATURE: 98 F | HEART RATE: 76 BPM | DIASTOLIC BLOOD PRESSURE: 85 MMHG

## 2020-07-30 LAB
HCT VFR BLD CALC: 28.7 % (ref 34–48)
HEMOGLOBIN: 9.3 G/DL (ref 11.5–15.5)
MCH RBC QN AUTO: 27.5 PG (ref 26–35)
MCHC RBC AUTO-ENTMCNC: 32.4 % (ref 32–34.5)
MCV RBC AUTO: 84.9 FL (ref 80–99.9)
PDW BLD-RTO: 16.8 FL (ref 11.5–15)
PLATELET # BLD: 195 E9/L (ref 130–450)
PMV BLD AUTO: 11 FL (ref 7–12)
RBC # BLD: 3.38 E12/L (ref 3.5–5.5)
WBC # BLD: 12.4 E9/L (ref 4.5–11.5)

## 2020-07-30 PROCEDURE — 36415 COLL VENOUS BLD VENIPUNCTURE: CPT

## 2020-07-30 PROCEDURE — 85027 COMPLETE CBC AUTOMATED: CPT

## 2020-07-30 PROCEDURE — 6370000000 HC RX 637 (ALT 250 FOR IP): Performed by: OBSTETRICS & GYNECOLOGY

## 2020-07-30 RX ADMIN — ACETAMINOPHEN 650 MG: 325 TABLET ORAL at 09:58

## 2020-07-30 RX ADMIN — METFORMIN HYDROCHLORIDE 1 TABLET: 500 TABLET, EXTENDED RELEASE ORAL at 09:57

## 2020-07-30 ASSESSMENT — PAIN SCALES - GENERAL: PAINLEVEL_OUTOF10: 8

## 2020-07-30 NOTE — CARE COORDINATION
SW Discharge Planning     SW received a call from [facility]) children services , Desiree Quintanilla ( [phone]) who reported that she WILL be involved with family. Shirlene Garcia reported that baby CAN be discharged when medically ready, and that she will contact mother today and do a home visit when the family arrives back in [place].      PLAN    Baby CAN be discharged home when medically ready, [address] services WILL follow up in the community     Electronically signed by TAMY Mcintosh on 7/30/2020 at 10:07 AM

## 2020-07-30 NOTE — PLAN OF CARE
Problem: Infection - Risk of, Puerperal Infection:  Goal: Will show no infection signs and symptoms  Description: Will show no infection signs and symptoms  7/29/2020 2339 by Connie Adams RN  Outcome: Met This Shift     Problem: Mood - Altered:  Goal: Mood stable  Description: Mood stable  7/29/2020 2339 by Connie Adams RN  Outcome: Met This Shift    Problem: Pain - Acute:  Goal: Pain level will decrease  Description: Pain level will decrease  7/29/2020 2339 by Connie Adams RN  Outcome: Ongoing

## 2020-07-30 NOTE — PROGRESS NOTES
Subjective:    Patient without complaints. Normal lochia. Patient wants to be discharged. Baby not being discharged till tomorrow. Objective:  /85   Pulse 76   Temp 98 °F (36.7 °C) (Oral)   Resp 16   LMP 08/17/2019   Breastfeeding Unknown   Abdomen:  Uterus firm, non-tender  Extremities:  No calf pain    Assessment:  Post-partum day # 1    Plan:Discharge home. Office in 6 wks. Call if pain, fever,heavy bleeding, calf pain, shortness of breath,breast pain or redness or concerns. Nothing in vagina or heavy lifting.

## 2020-07-30 NOTE — PROGRESS NOTES
Discharge and follow up instructions given to mother with infant teaching  and verbally understood. Discharged home in stable condition pt ambulated out to the bus stop. Baby staying in the nursery.

## 2020-07-31 NOTE — CARE COORDINATION
SW Discharge Planning     SW received verbal report from nurse manager Marce BARRY Reporting that mother has not yet come to be discharged with baby. Marce Milian reported that mother is now saying she won't be at the hospital until 6pm.     SW was later informed by baby's nurse, Gorge Fisher that mother called asking if her sister can come  baby. Gorge Fisher told SW that she explained to mother that baby can only be discharged home to mother. If mother does NOT present to the hospital by 6:30, nursing staff will need to call [facility], Jenny Estrada [phone] to inform her. Jenny Sean reported that this is her work cell phone and due to the circumstances will take calls from the hospital up till 7:30 pm. Cherly Oppenheim reported that she will be having [facility] ( [phone]) do a courtesy check up on family this evening or tomorrow morning. PLAN    If mother does NOT present to the hospital by 6:30, nursing staff will need to call [facility], Jenny Estrada [phone] to inform her.     Electronically signed by TAMY Bonner on 7/31/2020 at 3:21 PM

## 2021-07-01 ENCOUNTER — INITIAL PRENATAL (OUTPATIENT)
Dept: OBGYN | Age: 31
End: 2021-07-01
Payer: COMMERCIAL

## 2021-07-01 VITALS
DIASTOLIC BLOOD PRESSURE: 66 MMHG | HEART RATE: 78 BPM | BODY MASS INDEX: 23.3 KG/M2 | WEIGHT: 140 LBS | SYSTOLIC BLOOD PRESSURE: 112 MMHG

## 2021-07-01 DIAGNOSIS — Z12.4 PAP SMEAR FOR CERVICAL CANCER SCREENING: ICD-10-CM

## 2021-07-01 DIAGNOSIS — N91.2 AMENORRHEA: Primary | ICD-10-CM

## 2021-07-01 DIAGNOSIS — Z34.91 PRENATAL CARE IN FIRST TRIMESTER: ICD-10-CM

## 2021-07-01 DIAGNOSIS — N91.2 AMENORRHEA: ICD-10-CM

## 2021-07-01 LAB
BASOPHILS ABSOLUTE: 0.02 E9/L (ref 0–0.2)
BASOPHILS RELATIVE PERCENT: 0.3 % (ref 0–2)
CONTROL: POSITIVE
EOSINOPHILS ABSOLUTE: 0.17 E9/L (ref 0.05–0.5)
EOSINOPHILS RELATIVE PERCENT: 2.2 % (ref 0–6)
GLUCOSE URINE, POC: NORMAL
HCT VFR BLD CALC: 39.6 % (ref 34–48)
HEMOGLOBIN: 12.8 G/DL (ref 11.5–15.5)
IMMATURE GRANULOCYTES #: 0.03 E9/L
IMMATURE GRANULOCYTES %: 0.4 % (ref 0–5)
LYMPHOCYTES ABSOLUTE: 1.54 E9/L (ref 1.5–4)
LYMPHOCYTES RELATIVE PERCENT: 20.1 % (ref 20–42)
MCH RBC QN AUTO: 30.1 PG (ref 26–35)
MCHC RBC AUTO-ENTMCNC: 32.3 % (ref 32–34.5)
MCV RBC AUTO: 93.2 FL (ref 80–99.9)
MONOCYTES ABSOLUTE: 0.52 E9/L (ref 0.1–0.95)
MONOCYTES RELATIVE PERCENT: 6.8 % (ref 2–12)
NEUTROPHILS ABSOLUTE: 5.38 E9/L (ref 1.8–7.3)
NEUTROPHILS RELATIVE PERCENT: 70.2 % (ref 43–80)
PDW BLD-RTO: 14.6 FL (ref 11.5–15)
PLATELET # BLD: 305 E9/L (ref 130–450)
PMV BLD AUTO: 10.8 FL (ref 7–12)
PREGNANCY TEST URINE, POC: POSITIVE
PROTEIN UA: NEGATIVE
RBC # BLD: 4.25 E12/L (ref 3.5–5.5)
WBC # BLD: 7.7 E9/L (ref 4.5–11.5)

## 2021-07-01 PROCEDURE — 81002 URINALYSIS NONAUTO W/O SCOPE: CPT | Performed by: OBSTETRICS & GYNECOLOGY

## 2021-07-01 PROCEDURE — 99203 OFFICE O/P NEW LOW 30 MIN: CPT | Performed by: OBSTETRICS & GYNECOLOGY

## 2021-07-01 PROCEDURE — 1036F TOBACCO NON-USER: CPT | Performed by: OBSTETRICS & GYNECOLOGY

## 2021-07-01 PROCEDURE — 36415 COLL VENOUS BLD VENIPUNCTURE: CPT | Performed by: OBSTETRICS & GYNECOLOGY

## 2021-07-01 PROCEDURE — 81025 URINE PREGNANCY TEST: CPT | Performed by: OBSTETRICS & GYNECOLOGY

## 2021-07-01 PROCEDURE — G8427 DOCREV CUR MEDS BY ELIG CLIN: HCPCS | Performed by: OBSTETRICS & GYNECOLOGY

## 2021-07-01 PROCEDURE — G8420 CALC BMI NORM PARAMETERS: HCPCS | Performed by: OBSTETRICS & GYNECOLOGY

## 2021-07-01 RX ORDER — PNV NO.95/FERROUS FUM/FOLIC AC 28MG-0.8MG
1 TABLET ORAL DAILY
Qty: 30 TABLET | Refills: 12 | Status: SHIPPED | OUTPATIENT
Start: 2021-07-01

## 2021-07-01 NOTE — PROGRESS NOTES
Pt is new to our office and here for initial OB appt. States she has not received any prenatal care as she had multiple deaths in her family in the past few months. She also recently moved back to the area. States LMP was approximately 3/29/21. Had + home pregnancy test.  Urine pregnancy test done today with positive results.

## 2021-07-01 NOTE — PROGRESS NOTES
This is a new patient to me and to the [facility]. Here for pregnancy. Has actually delivered here twice but was not a patient here. Has now moved from [place] to here. No prenatal care to date. No history of abnormal pap smears. Delivered here when was visiting from [place] both times. Pelvic: Vulva:Normal   Vagina:Clear   Cx:No lesions, TPP, Cultures and HPV xco-testing done   Ut:about 14 weeks, may be ahead of dates. Shalom:No masses noted. PLAN: Labs, MFM appt RTC 4 weeks.

## 2021-07-02 LAB
ABO/RH: NORMAL
ANTIBODY SCREEN: NORMAL
HEPATITIS B SURFACE ANTIGEN INTERPRETATION: NORMAL
HIV-1 AND HIV-2 ANTIBODIES: NORMAL
RPR: NORMAL

## 2021-07-05 LAB
CHLAMYDIA BY THIN PREP: NEGATIVE
HPV SAMPLE: ABNORMAL
HPV TYPE 16: NOT DETECTED
HPV TYPE 18: NOT DETECTED
HPV, HIGH RISK OTHER: DETECTED
INTERPRETATION: ABNORMAL
N. GONORRHOEAE DNA, THIN PREP: NEGATIVE
RUBELLA ANTIBODY IGG: NORMAL
SOURCE: ABNORMAL
SOURCE: NORMAL

## 2021-07-06 LAB — VARICELLA-ZOSTER VIRUS AB, IGG: NORMAL

## 2021-07-08 NOTE — RESULT ENCOUNTER NOTE
Updated appt notes and added colpo to prenatal visit on 7/28. Attempted to call pt and notify her that procedure will be done at this visit but she did not answer. Message was left for her to call office back.

## 2021-07-10 LAB
CYSTIC FIBROSIS 165 VARIANTS INTERP: NORMAL
CYSTIC FIBROSIS 5T VARIANT: NORMAL
CYSTIC FIBROSIS ALLELE 1: NEGATIVE
CYSTIC FIBROSIS ALLELE 2: NEGATIVE

## 2021-08-04 ENCOUNTER — HOSPITAL ENCOUNTER (EMERGENCY)
Age: 31
Discharge: LEFT BEFORE DISCHARGE | End: 2021-08-04
Payer: COMMERCIAL

## 2021-08-04 ENCOUNTER — ANCILLARY PROCEDURE (OUTPATIENT)
Dept: OBGYN CLINIC | Age: 31
End: 2021-08-04
Payer: COMMERCIAL

## 2021-08-04 ENCOUNTER — INITIAL PRENATAL (OUTPATIENT)
Dept: OBGYN CLINIC | Age: 31
End: 2021-08-04
Payer: COMMERCIAL

## 2021-08-04 VITALS
BODY MASS INDEX: 25.66 KG/M2 | WEIGHT: 154 LBS | HEIGHT: 65 IN | DIASTOLIC BLOOD PRESSURE: 74 MMHG | HEART RATE: 92 BPM | SYSTOLIC BLOOD PRESSURE: 111 MMHG

## 2021-08-04 DIAGNOSIS — Z03.75 SUSPECTED SHORTENING OF CERVIX NOT FOUND: ICD-10-CM

## 2021-08-04 DIAGNOSIS — O09.899 SHORT INTERVAL BETWEEN PREGNANCIES AFFECTING PREGNANCY, ANTEPARTUM: ICD-10-CM

## 2021-08-04 DIAGNOSIS — O44.40 LOW-LYING PLACENTA: ICD-10-CM

## 2021-08-04 DIAGNOSIS — O99.330 TOBACCO USE AFFECTING PREGNANCY, ANTEPARTUM: ICD-10-CM

## 2021-08-04 DIAGNOSIS — Z36.3 ENCOUNTER FOR ANTENATAL SCREENING FOR MALFORMATION USING ULTRASOUND: Primary | ICD-10-CM

## 2021-08-04 DIAGNOSIS — F12.90 MARIJUANA USE: ICD-10-CM

## 2021-08-04 DIAGNOSIS — O43.112 CIRCUMVALLATE PLACENTA IN SECOND TRIMESTER: ICD-10-CM

## 2021-08-04 DIAGNOSIS — O41.8X20 SUBCHORIONIC HEMATOMA IN SECOND TRIMESTER, SINGLE OR UNSPECIFIED FETUS: ICD-10-CM

## 2021-08-04 DIAGNOSIS — Z3A.18 18 WEEKS GESTATION OF PREGNANCY: ICD-10-CM

## 2021-08-04 DIAGNOSIS — O46.8X2 SUBCHORIONIC HEMATOMA IN SECOND TRIMESTER, SINGLE OR UNSPECIFIED FETUS: ICD-10-CM

## 2021-08-04 PROCEDURE — 99203 OFFICE O/P NEW LOW 30 MIN: CPT | Performed by: OBSTETRICS & GYNECOLOGY

## 2021-08-04 PROCEDURE — 81002 URINALYSIS NONAUTO W/O SCOPE: CPT | Performed by: OBSTETRICS & GYNECOLOGY

## 2021-08-04 PROCEDURE — 76811 OB US DETAILED SNGL FETUS: CPT | Performed by: OBSTETRICS & GYNECOLOGY

## 2021-08-04 PROCEDURE — 76817 TRANSVAGINAL US OBSTETRIC: CPT | Performed by: OBSTETRICS & GYNECOLOGY

## 2021-08-04 PROCEDURE — 99999 PR OFFICE/OUTPT VISIT,PROCEDURE ONLY: CPT | Performed by: OBSTETRICS & GYNECOLOGY

## 2021-08-04 NOTE — PATIENT INSTRUCTIONS
if you are sick, not feeling well or have an infectious process going on please reschedule your appointment by calling [phone]. Also if any family members are not feeling well, please do not bring them to your appointment. We appreciate your cooperation. We are doing this in order to protect our pregnant mothers+ their babies. Call your primary obstetrician with bleeding, leaking of fluid, abdominal tenderness, headache, blurry vision, epigastric pain and increased urinary frequency. Any questions contact Gerald at [phone]. If you are experiencing an emergency and need immediate help, call 911 or go to go emergency room or labor and delivery. Please arrive for your scheduled appointment at least 15 minutes early with your actual insurance card+ a photo ID. Also if you need any refills ordered or have questions, it may take up 48 hours to reply. Please allow ample time for your refills. Call me when you use last refill. Thank you for your cooperation. Patient Education        Weeks 14 to 25 of Your Pregnancy: Care Instructions  Your Care Instructions     During this time, you may start to \"show,\" so that you look pregnant to people around you. You may also notice some changes in your skin, such as itchy spots on your palms or acne on your face. Your baby is now able to pass urine, and your baby's first stool (meconium) is starting to collect in his or her intestines. Hair is also beginning to grow on your baby's head. At your next visit, between weeks 18 and 20, your doctor may do an ultrasound test. The test allows your doctor to check for certain problems. Your doctor can also tell the sex of your baby. This is a good time to think about whether you want to know whether your baby is a boy or a girl. Talk to your doctor about getting a flu shot to help keep you healthy during your pregnancy. As your pregnancy moves along, it is common to worry or feel anxious. Your body is changing a lot.  And you are thinking about giving birth, the health of your baby, and becoming a parent. You can learn to cope with any anxiety and stress you feel. Follow-up care is a key part of your treatment and safety. Be sure to make and go to all appointments, and call your doctor if you are having problems. It's also a good idea to know your test results and keep a list of the medicines you take. How can you care for yourself at home? Reduce stress    · Ask for help with cooking and housekeeping.     · Figure out who or what causes your stress. Avoid these people or situations as much as possible.     · Relax every day. Taking 10- to 15-minute breaks can make a big difference. Take a walk, listen to music, or take a warm bath.     · Learn relaxation techniques at prenatal or yoga class. Or buy a relaxation tape.     · List your fears about having a baby and becoming a parent. Share the list with someone you trust. Decide which worries are really small, and try to let them go. Exercise    · If you did not exercise much before pregnancy, start slowly. Walking is best. [employer], and do a little more every day.     · Brisk walking, easy jogging, low-impact aerobics, water aerobics, and yoga are good choices. Some sports, such as scuba diving, horseback riding, downhill skiing, gymnastics, and water skiing, are not a good idea.     · Try to do at least 2½ hours a week of moderate exercise, such as a fast walk. One way to do this is to be active 30 minutes a day, at least 5 days a week.     · Wear loose clothing. And wear shoes and a bra that provide good support.     · Warm up and cool down to start and finish your exercise.     · If you want to use weights, be sure to use light weights. They reduce stress on your joints.    Stay at the best weight for you    · Experts recommend that you gain about 1 pound a month during the first 3 months of your pregnancy.     · Experts recommend that you gain about 1 pound a week during your last 6 months of pregnancy, for a total weight gain of 25 to 35 pounds.     · If you are underweight, you will need to gain more weight (about 28 to 40 pounds).     · If you are overweight, you may not need to gain as much weight (about 15 to 25 pounds).     · If you are gaining weight too fast, use common sense. Exercise every day, and limit sweets, fast foods, and fats. Choose lean meats, fruits, and vegetables.     · If you are having twins or more, your doctor may refer you to a dietitian. Where can you learn more? Go to https://Hemp 4 Haitipepiceweb.Partnered. org and sign in to your Pint Please account. Enter C260 in the Gleam box to learn more about \"Weeks 14 to 18 of Your Pregnancy: Care Instructions. \"     If you do not have an account, please click on the \"Sign Up Now\" link. Current as of: October 8, 2020               Content Version: 12.9  © 2006-2021 [employer]. Care instructions adapted under license by [facility] ([facility]). If you have questions about a medical condition or this instruction, always ask your healthcare professional. [place] [address] any warranty or liability for your use of this information. Patient Education        Learning About When to Call Your Doctor During Pregnancy (Up to 20 Weeks)  Your Care Instructions     It's common to have concerns about what might be a problem during pregnancy. Although most pregnant women don't have any serious problems, it's important to know when to call your doctor if you have certain symptoms. These are general suggestions. Your doctor may give you some more information about when to call. When to call your doctor (up to 20 weeks)  Call 911 anytime you think you may need emergency care. For example, call if:  · You passed out (lost consciousness). Call your doctor now or seek immediate medical care if:  · You have a fever. · You have vaginal bleeding.   · You are dizzy or lightheaded, or you feel like you may faint. · You have symptoms of a urinary tract infection. These may include:  ? Pain or burning when you urinate. ? A frequent need to urinate without being able to pass much urine. ? Pain in the flank, which is just below the rib cage and above the waist on either side of the back. ? Blood in your urine. · You have belly pain. · You think you are having contractions. · You have a sudden release of fluid from your vagina. Watch closely for changes in your health, and be sure to contact your doctor if:  · You have vaginal discharge that smells bad. · You have other concerns about your pregnancy. Follow-up care is a key part of your treatment and safety. Be sure to make and go to all appointments, and call your doctor if you are having problems. It's also a good idea to know your test results and keep a list of the medicines you take. Where can you learn more? Go to https://RemitlypeFuze.[employer]. org and sign in to your Genmab account. Enter Q759 in the Safari Property box to learn more about \"Learning About When to Call Your Doctor During Pregnancy (Up to 20 Weeks). \"     If you do not have an account, please click on the \"Sign Up Now\" link. Current as of: October 8, 2020               Content Version: 12.9  © 2006-2021 [employer]wise[employer]. Care instructions adapted under license by [facility] ([facility]). If you have questions about a medical condition or this instruction, always ask your healthcare professional. [place] [address] any warranty or liability for your use of this information.

## 2021-08-04 NOTE — PROGRESS NOTES
Patient here as new OB for prenatal ultrasound  Denies any bleeding,cramping or LOF  Unable to void at this time.   +FM noted

## 2021-08-18 ENCOUNTER — TELEPHONE (OUTPATIENT)
Dept: OBGYN | Age: 31
End: 2021-08-18

## 2021-08-18 ENCOUNTER — CLINICAL DOCUMENTATION (OUTPATIENT)
Dept: OBGYN | Age: 31
End: 2021-08-18

## 2021-08-18 NOTE — TELEPHONE ENCOUNTER
I followed up with patient about our Centering Program . Patient declined due to moving out of state. Patient will call back to make an appointment in the next few day to come in and see provider for one last checkup before living the state .

## 2021-08-25 ENCOUNTER — TELEPHONE (OUTPATIENT)
Dept: OBGYN CLINIC | Age: 31
End: 2021-08-25

## 2021-12-29 ENCOUNTER — HOSPITAL ENCOUNTER (EMERGENCY)
Age: 31
Discharge: HOME | End: 2021-12-29
Attending: STUDENT IN AN ORGANIZED HEALTH CARE EDUCATION/TRAINING PROGRAM
Payer: COMMERCIAL

## 2021-12-29 ENCOUNTER — APPOINTMENT (OUTPATIENT)
Dept: CT IMAGING | Age: 31
End: 2021-12-29
Payer: COMMERCIAL

## 2021-12-29 ENCOUNTER — APPOINTMENT (OUTPATIENT)
Dept: ULTRASOUND IMAGING | Age: 31
End: 2021-12-29
Payer: COMMERCIAL

## 2021-12-29 VITALS
DIASTOLIC BLOOD PRESSURE: 66 MMHG | RESPIRATION RATE: 18 BRPM | SYSTOLIC BLOOD PRESSURE: 108 MMHG | HEART RATE: 91 BPM | OXYGEN SATURATION: 97 % | TEMPERATURE: 98.6 F

## 2021-12-29 DIAGNOSIS — N10 ACUTE PYELONEPHRITIS: Primary | ICD-10-CM

## 2021-12-29 LAB
ALBUMIN SERPL-MCNC: 4.2 G/DL (ref 3.5–5.2)
ALP BLD-CCNC: 95 U/L (ref 35–104)
ALT SERPL-CCNC: 9 U/L (ref 0–32)
ANION GAP SERPL CALCULATED.3IONS-SCNC: 16 MMOL/L (ref 7–16)
AST SERPL-CCNC: 10 U/L (ref 0–31)
BACTERIA: ABNORMAL /HPF
BASOPHILS ABSOLUTE: 0.05 E9/L (ref 0–0.2)
BASOPHILS RELATIVE PERCENT: 0.3 % (ref 0–2)
BILIRUB SERPL-MCNC: 1.5 MG/DL (ref 0–1.2)
BILIRUBIN URINE: NEGATIVE
BLOOD, URINE: ABNORMAL
BUN BLDV-MCNC: 11 MG/DL (ref 6–20)
CALCIUM SERPL-MCNC: 9.6 MG/DL (ref 8.6–10.2)
CHLORIDE BLD-SCNC: 94 MMOL/L (ref 98–107)
CLARITY: ABNORMAL
CO2: 20 MMOL/L (ref 22–29)
COLOR: YELLOW
CREAT SERPL-MCNC: 0.9 MG/DL (ref 0.5–1)
EOSINOPHILS ABSOLUTE: 0.01 E9/L (ref 0.05–0.5)
EOSINOPHILS RELATIVE PERCENT: 0.1 % (ref 0–6)
EPITHELIAL CELLS, UA: ABNORMAL /HPF
GFR AFRICAN AMERICAN: >60
GFR NON-AFRICAN AMERICAN: >60 ML/MIN/1.73
GLUCOSE BLD-MCNC: 97 MG/DL (ref 74–99)
GLUCOSE URINE: NEGATIVE MG/DL
HCT VFR BLD CALC: 48.1 % (ref 34–48)
HEMOGLOBIN: 15.6 G/DL (ref 11.5–15.5)
IMMATURE GRANULOCYTES #: 0.08 E9/L
IMMATURE GRANULOCYTES %: 0.4 % (ref 0–5)
KETONES, URINE: NEGATIVE MG/DL
LACTIC ACID, SEPSIS: 0.8 MMOL/L (ref 0.5–1.9)
LEUKOCYTE ESTERASE, URINE: ABNORMAL
LIPASE: 16 U/L (ref 13–60)
LYMPHOCYTES ABSOLUTE: 1.58 E9/L (ref 1.5–4)
LYMPHOCYTES RELATIVE PERCENT: 8.1 % (ref 20–42)
MCH RBC QN AUTO: 28.3 PG (ref 26–35)
MCHC RBC AUTO-ENTMCNC: 32.4 % (ref 32–34.5)
MCV RBC AUTO: 87.3 FL (ref 80–99.9)
MONOCYTES ABSOLUTE: 1.43 E9/L (ref 0.1–0.95)
MONOCYTES RELATIVE PERCENT: 7.3 % (ref 2–12)
NEUTROPHILS ABSOLUTE: 16.33 E9/L (ref 1.8–7.3)
NEUTROPHILS RELATIVE PERCENT: 83.8 % (ref 43–80)
NITRITE, URINE: POSITIVE
PDW BLD-RTO: 15.4 FL (ref 11.5–15)
PH UA: 6 (ref 5–9)
PLATELET # BLD: 275 E9/L (ref 130–450)
PMV BLD AUTO: 10.7 FL (ref 7–12)
POTASSIUM REFLEX MAGNESIUM: 3.7 MMOL/L (ref 3.5–5)
PROTEIN UA: 100 MG/DL
RBC # BLD: 5.51 E12/L (ref 3.5–5.5)
RBC UA: ABNORMAL /HPF (ref 0–2)
SARS-COV-2, NAAT: NOT DETECTED
SODIUM BLD-SCNC: 130 MMOL/L (ref 132–146)
SPECIFIC GRAVITY UA: 1.02 (ref 1–1.03)
TOTAL PROTEIN: 9 G/DL (ref 6.4–8.3)
UROBILINOGEN, URINE: 0.2 E.U./DL
WBC # BLD: 19.5 E9/L (ref 4.5–11.5)
WBC UA: >20 /HPF (ref 0–5)

## 2021-12-29 PROCEDURE — 76705 ECHO EXAM OF ABDOMEN: CPT

## 2021-12-29 PROCEDURE — 83605 ASSAY OF LACTIC ACID: CPT

## 2021-12-29 PROCEDURE — 74177 CT ABD & PELVIS W/CONTRAST: CPT

## 2021-12-29 PROCEDURE — 99283 EMERGENCY DEPT VISIT LOW MDM: CPT

## 2021-12-29 PROCEDURE — 87040 BLOOD CULTURE FOR BACTERIA: CPT

## 2021-12-29 PROCEDURE — 2580000003 HC RX 258: Performed by: PHYSICIAN ASSISTANT

## 2021-12-29 PROCEDURE — 96365 THER/PROPH/DIAG IV INF INIT: CPT

## 2021-12-29 PROCEDURE — 87186 SC STD MICRODIL/AGAR DIL: CPT

## 2021-12-29 PROCEDURE — 87088 URINE BACTERIA CULTURE: CPT

## 2021-12-29 PROCEDURE — 83690 ASSAY OF LIPASE: CPT

## 2021-12-29 PROCEDURE — 6360000004 HC RX CONTRAST MEDICATION: Performed by: RADIOLOGY

## 2021-12-29 PROCEDURE — 81001 URINALYSIS AUTO W/SCOPE: CPT

## 2021-12-29 PROCEDURE — 85025 COMPLETE CBC W/AUTO DIFF WBC: CPT

## 2021-12-29 PROCEDURE — 2580000003 HC RX 258: Performed by: STUDENT IN AN ORGANIZED HEALTH CARE EDUCATION/TRAINING PROGRAM

## 2021-12-29 PROCEDURE — 6360000002 HC RX W HCPCS: Performed by: STUDENT IN AN ORGANIZED HEALTH CARE EDUCATION/TRAINING PROGRAM

## 2021-12-29 PROCEDURE — 6370000000 HC RX 637 (ALT 250 FOR IP): Performed by: STUDENT IN AN ORGANIZED HEALTH CARE EDUCATION/TRAINING PROGRAM

## 2021-12-29 PROCEDURE — 36415 COLL VENOUS BLD VENIPUNCTURE: CPT

## 2021-12-29 PROCEDURE — 87635 SARS-COV-2 COVID-19 AMP PRB: CPT

## 2021-12-29 PROCEDURE — 96361 HYDRATE IV INFUSION ADD-ON: CPT

## 2021-12-29 PROCEDURE — 80053 COMPREHEN METABOLIC PANEL: CPT

## 2021-12-29 RX ORDER — OXYCODONE HYDROCHLORIDE AND ACETAMINOPHEN 5; 325 MG/1; MG/1
1 TABLET ORAL ONCE
Status: COMPLETED | OUTPATIENT
Start: 2021-12-29 | End: 2021-12-29

## 2021-12-29 RX ORDER — 0.9 % SODIUM CHLORIDE 0.9 %
1000 INTRAVENOUS SOLUTION INTRAVENOUS ONCE
Status: DISCONTINUED | OUTPATIENT
Start: 2021-12-29 | End: 2021-12-29 | Stop reason: HOSPADM

## 2021-12-29 RX ORDER — 0.9 % SODIUM CHLORIDE 0.9 %
1000 INTRAVENOUS SOLUTION INTRAVENOUS ONCE
Status: COMPLETED | OUTPATIENT
Start: 2021-12-29 | End: 2021-12-29

## 2021-12-29 RX ORDER — OXYCODONE HYDROCHLORIDE AND ACETAMINOPHEN 5; 325 MG/1; MG/1
1 TABLET ORAL EVERY 6 HOURS PRN
Qty: 12 TABLET | Refills: 0 | Status: SHIPPED | OUTPATIENT
Start: 2021-12-29 | End: 2022-01-01

## 2021-12-29 RX ORDER — CEFDINIR 300 MG/1
300 CAPSULE ORAL 2 TIMES DAILY
Qty: 20 CAPSULE | Refills: 0 | Status: SHIPPED | OUTPATIENT
Start: 2021-12-29 | End: 2022-01-08

## 2021-12-29 RX ORDER — ONDANSETRON 4 MG/1
4 TABLET, ORALLY DISINTEGRATING ORAL EVERY 8 HOURS PRN
Qty: 5 TABLET | Refills: 0 | Status: SHIPPED | OUTPATIENT
Start: 2021-12-29

## 2021-12-29 RX ADMIN — WATER 1000 MG: 1 INJECTION, SOLUTION INTRAMUSCULAR; INTRAVENOUS; SUBCUTANEOUS at 18:36

## 2021-12-29 RX ADMIN — SODIUM CHLORIDE 1000 ML: 9 INJECTION, SOLUTION INTRAVENOUS at 16:25

## 2021-12-29 RX ADMIN — OXYCODONE AND ACETAMINOPHEN 1 TABLET: 5; 325 TABLET ORAL at 17:51

## 2021-12-29 RX ADMIN — IOPAMIDOL 75 ML: 755 INJECTION, SOLUTION INTRAVENOUS at 17:48

## 2021-12-29 ASSESSMENT — ENCOUNTER SYMPTOMS
ABDOMINAL PAIN: 1
CHEST TIGHTNESS: 0
COUGH: 0
VOMITING: 1
SHORTNESS OF BREATH: 0
NAUSEA: 1
PHOTOPHOBIA: 0
ABDOMINAL DISTENTION: 0
DIARRHEA: 0

## 2021-12-29 ASSESSMENT — PAIN SCALES - GENERAL: PAINLEVEL_OUTOF10: 8

## 2021-12-29 NOTE — ED PROVIDER NOTES
Gabriela Walsh is a 33year old female who presented to ED with concern for dysuria and right flank pain. Symptoms started several days ago as dark urine followed by dysuria and flank pain for 2 days with chills. Patient recently had baby 12/6/21, patient is not breast feeding. Patient denies hx of kidney stones, baby was born by spontaneous vaginal delivery. Patient denies vaginal bleeding or discharge. Patient denies chest pain, shortness of breath. Patient is having some nausea without vomiting. The history is provided by the patient and medical records. Review of Systems   Constitutional: Positive for chills and fatigue. Negative for diaphoresis and fever. Eyes: Negative for photophobia and visual disturbance. Respiratory: Negative for cough, chest tightness and shortness of breath. Cardiovascular: Negative for chest pain, palpitations and leg swelling. Gastrointestinal: Positive for abdominal pain, nausea and vomiting. Negative for abdominal distention and diarrhea. Genitourinary: Positive for dysuria and flank pain. Negative for vaginal bleeding, vaginal discharge and vaginal pain. Musculoskeletal: Negative for neck pain and neck stiffness. Skin: Negative for pallor and rash. Neurological: Negative for headaches. Psychiatric/Behavioral: Negative for confusion. Physical Exam  Vitals and nursing note reviewed. Constitutional:       General: She is not in acute distress. Appearance: She is ill-appearing. HENT:      Head: Normocephalic and atraumatic. Eyes:      General: No scleral icterus. Conjunctiva/sclera: Conjunctivae normal.      Pupils: Pupils are equal, round, and reactive to light. Cardiovascular:      Rate and Rhythm: Normal rate and regular rhythm. Pulmonary:      Effort: Pulmonary effort is normal.      Breath sounds: Normal breath sounds. Abdominal:      General: Bowel sounds are normal. There is no distension.       Palpations: Abdomen is soft.      Tenderness: There is abdominal tenderness. There is right CVA tenderness. There is no guarding or rebound. Comments: Right flank tenderness  Suprapubic tenderness   Musculoskeletal:      Cervical back: Normal range of motion and neck supple. No rigidity or tenderness. No muscular tenderness. Right lower leg: No edema. Left lower leg: No edema. Skin:     General: Skin is warm and dry. Capillary Refill: Capillary refill takes less than 2 seconds. Coloration: Skin is not pale. Findings: No erythema or rash. Neurological:      Mental Status: She is alert and oriented to person, place, and time. Psychiatric:         Mood and Affect: Mood normal.          Procedures     MDM  Number of Diagnoses or Management Options  Acute pyelonephritis  Diagnosis management comments: Janene Payne is a 33year old female who presented to ED with concern for flank pain and dysuria. Patient was found to have UTI with findings concerning for pyelonephritis on CT scan, patient started on abx in ED, tachycardia resolved with IVF and pain medication, patient will be discharged home on supportive care advised patient to return to ED if symptoms do not improve in 24 hours, patient is agreeable to plan and well appearing at time of discharge not in any distress and significantly improved. Patient denies GYN complaints        Amount and/or Complexity of Data Reviewed  Clinical lab tests: reviewed  Tests in the radiology section of CPT®: reviewed  Decide to obtain previous medical records or to obtain history from someone other than the patient: yes              --------------------------------------------- PAST HISTORY ---------------------------------------------  Past Medical History:  has a past medical history of Anemia and Asthma. Past Surgical History:  has no past surgical history on file. Social History:  reports that she quit smoking about 7 months ago.  Her smoking use included cigarettes. She has never used smokeless tobacco. She reports previous alcohol use. She reports previous drug use. Drug: Marijuana Jeff Blow). Family History: family history is not on file. The patients home medications have been reviewed.     Allergies: Ibuprofen and Bee venom    -------------------------------------------------- RESULTS -------------------------------------------------  Labs:  Results for orders placed or performed during the hospital encounter of 12/29/21   COVID-19, Rapid    Specimen: Nasopharyngeal Swab   Result Value Ref Range    SARS-CoV-2, NAAT Not Detected Not Detected   CBC Auto Differential   Result Value Ref Range    WBC 19.5 (H) 4.5 - 11.5 E9/L    RBC 5.51 (H) 3.50 - 5.50 E12/L    Hemoglobin 15.6 (H) 11.5 - 15.5 g/dL    Hematocrit 48.1 (H) 34.0 - 48.0 %    MCV 87.3 80.0 - 99.9 fL    MCH 28.3 26.0 - 35.0 pg    MCHC 32.4 32.0 - 34.5 %    RDW 15.4 (H) 11.5 - 15.0 fL    Platelets 726 206 - 844 E9/L    MPV 10.7 7.0 - 12.0 fL    Neutrophils % 83.8 (H) 43.0 - 80.0 %    Immature Granulocytes % 0.4 0.0 - 5.0 %    Lymphocytes % 8.1 (L) 20.0 - 42.0 %    Monocytes % 7.3 2.0 - 12.0 %    Eosinophils % 0.1 0.0 - 6.0 %    Basophils % 0.3 0.0 - 2.0 %    Neutrophils Absolute 16.33 (H) 1.80 - 7.30 E9/L    Immature Granulocytes # 0.08 E9/L    Lymphocytes Absolute 1.58 1.50 - 4.00 E9/L    Monocytes Absolute 1.43 (H) 0.10 - 0.95 E9/L    Eosinophils Absolute 0.01 (L) 0.05 - 0.50 E9/L    Basophils Absolute 0.05 0.00 - 0.20 E9/L   Comprehensive Metabolic Panel w/ Reflex to MG   Result Value Ref Range    Sodium 130 (L) 132 - 146 mmol/L    Potassium reflex Magnesium 3.7 3.5 - 5.0 mmol/L    Chloride 94 (L) 98 - 107 mmol/L    CO2 20 (L) 22 - 29 mmol/L    Anion Gap 16 7 - 16 mmol/L    Glucose 97 74 - 99 mg/dL    BUN 11 6 - 20 mg/dL    CREATININE 0.9 0.5 - 1.0 mg/dL    GFR Non-African American >60 >=60 mL/min/1.73    GFR African American >60     Calcium 9.6 8.6 - 10.2 mg/dL    Total Protein 9.0 (H) 6.4 - 8.3 g/dL Albumin 4.2 3.5 - 5.2 g/dL    Total Bilirubin 1.5 (H) 0.0 - 1.2 mg/dL    Alkaline Phosphatase 95 35 - 104 U/L    ALT 9 0 - 32 U/L    AST 10 0 - 31 U/L   Lipase   Result Value Ref Range    Lipase 16 13 - 60 U/L   Urinalysis, reflex to microscopic   Result Value Ref Range    Color, UA Yellow Straw/Yellow    Clarity, UA CLOUDY (A) Clear    Glucose, Ur Negative Negative mg/dL    Bilirubin Urine Negative Negative    Ketones, Urine Negative Negative mg/dL    Specific Gravity, UA 1.020 1.005 - 1.030    Blood, Urine LARGE (A) Negative    pH, UA 6.0 5.0 - 9.0    Protein,  (A) Negative mg/dL    Urobilinogen, Urine 0.2 <2.0 E.U./dL    Nitrite, Urine POSITIVE (A) Negative    Leukocyte Esterase, Urine MODERATE (A) Negative   Lactate, Sepsis   Result Value Ref Range    Lactic Acid, Sepsis 0.8 0.5 - 1.9 mmol/L   Microscopic Urinalysis   Result Value Ref Range    WBC, UA >20 (A) 0 - 5 /HPF    RBC, UA 1-3 0 - 2 /HPF    Epithelial Cells, UA MODERATE /HPF    Bacteria, UA MANY (A) None Seen /HPF       Radiology:  CT ABDOMEN PELVIS W IV CONTRAST Additional Contrast? None   Final Result   1. Findings consistent with right-sided pyelonephritis. 2. Abnormal appearance of the uterine canal.  Hypervascular structure could   represent a pedunculated fibroid or possibly an intrauterine pregnancy. Correlation with beta HCG and possibly pelvic ultrasound as clinically   indicated is suggested. RECOMMENDATIONS:   Unavailable         US GALLBLADDER RUQ   Final Result   Unremarkable right upper quadrant ultrasound. RECOMMENDATIONS:   Unavailable             ------------------------- NURSING NOTES AND VITALS REVIEWED ---------------------------  Date / Time Roomed:  12/29/2021  4:11 PM  ED Bed Assignment:  12/12    The nursing notes within the ED encounter and vital signs as below have been reviewed.    /66   Pulse 91   Temp 98.6 °F (37 °C) (Oral)   Resp 18   LMP 03/29/2021 (Approximate)   SpO2 97%   Breastfeeding

## 2021-12-29 NOTE — ED TRIAGE NOTES
Department of Emergency Medicine  FIRST PROVIDER TRIAGE NOTE             Independent MLP           12/29/21  11:21 AM EST    Date of Encounter: 12/29/21   MRN: [id number]      HPI: William Santana is a 33 y.o. female who presents to the ED for Abdominal Pain (States she has had lower abd pain x 2 daysm states she recently gave birth 12/6/2021.  states has had dark urine. )   right upper abdominal pain x 2 days along with sweats, chills, malodorous and dark urine. Denies dysuria. Change in taste and smell. Had baby 12/6/2021. Uncomplicated vaginal delivery. No vaginal bleeding or discharge. ROS: Negative for cp, sob, fever, vomiting or diarrhea. PE: Gen Appearance/Constitutional: alert  CV: regular rate  Pulm: CTA bilat  GI: tender to palpation right upper quadrant. Initial Plan of Care: All treatment areas with department are currently occupied. Plan to order/Initiate the following while awaiting opening in ED: labs and imaging studies.     Initial Plan of Care: Initiate Treatment-Testing, Proceed toTreatment Area When Bed Available for ED Attending/MLP to Continue Care    Electronically signed by GREG Simpson   DD: 12/29/21

## 2021-12-29 NOTE — ED NOTES
Radiology Procedure Waiver   Name: Jennifer Cottrell  : 1990  MRN: [id number]    Date:  21    Time: 5:42 PM EST    Benefits of immediately proceeding with Radiology exam(s) without pre-testing outweigh the risks or are not indicated as specified below and therefore the following is/are being waived:    [x] Pregnancy test   [] Patients LMP on-time and regular.   [] Patient had Tubal Ligation or has other Contraception Device. [] Patient  is Menopausal or Premenarcheal.    [] Patient had Full or Partial Hysterectomy. [] Protocol for Iodine allergy    [] MRI Questionnaire     [] BUN/Creatinine   [] Patient age w/no hx of renal dysfunction. [] Patient on Dialysis. [] Recent Normal Labs.   Electronically signed by GREG Espinal on 21 at 5:42 PM EST          Recent delivery on 21     Virginia Espinalma  [id number]

## 2021-12-31 LAB
ORGANISM: ABNORMAL
URINE CULTURE, ROUTINE: ABNORMAL

## 2022-01-03 LAB — BLOOD CULTURE, ROUTINE: NORMAL

## 2022-01-10 ENCOUNTER — NURSE TRIAGE (OUTPATIENT)
Dept: OTHER | Facility: CLINIC | Age: 32
End: 2022-01-10

## 2022-01-10 NOTE — TELEPHONE ENCOUNTER
Subjective: Caller states \"I have stomach pain that is worsening since being seen in ED, loss of appetite, losing weight, the pain is directly below below button in lower abd \" Pt found out from OB while pregnant that she has cervical cancer. It is on the L side and there was nothing they could do until she delivered. Pt was told in September of 2021. Pt did have kidney stones about 2 weeks ago and has passed it. Pt was told to schedule an appt with PCP but it is not until the 20th and her pain is worsening so she doesn't feel she can wait that long. Current Symptoms:  See above     Onset: 2 weeks ago; worsening    Associated Symptoms: reduced appetite    Pain Severity: 8/10; sharp shooting like lightning bolts ; intermittent    Temperature: denies      What has been tried: \"dont know what it is so I have done nothing yet\"    LMP: just stopped bleeding about 4 days ago after delivery  Pregnant: No    Recommended disposition: ED now. Care advice provided, patient verbalizes understanding; denies any other questions or concerns; instructed to call back for any new or worsening symptoms. Patient/caller proceeding to [facility]  Emergency Department     Attention Provider: Thank you for allowing me to participate in the care of your patient. The patient was connected to triage in response to information provided to the ECC/PSC. Please do not respond through this encounter as the response is not directed to a shared pool.       Reason for Disposition   SEVERE abdominal pain (e.g., excruciating)    Protocols used: ABDOMINAL PAIN - [facility] - [facility] CANO

## 2022-01-10 NOTE — TELEPHONE ENCOUNTER
Received call from Nita Quevedo at [place], caller not on line. Complaint: Extreme abd pain, right below stomach above cervix, has hx of cervical cancer and had a baby in December. Market: [employer] Name: Has new pt appt scheduled at [facility] on 1/20/22. Is currently unestablished. Caller's telephone number verified as [phone]    Unsuccessful attempt to re-connect with caller via phone, left message for return call to office. Writer also advised patient on message to be seen at ED if urgent symptoms. Reason for Disposition   Message left on unidentified voice mail. Phone number verified.     Protocols used: NO CONTACT OR DUPLICATE CONTACT CALL-ADULT-